# Patient Record
Sex: MALE | Race: WHITE | URBAN - METROPOLITAN AREA
[De-identification: names, ages, dates, MRNs, and addresses within clinical notes are randomized per-mention and may not be internally consistent; named-entity substitution may affect disease eponyms.]

---

## 2017-07-15 ENCOUNTER — EMERGENCY (EMERGENCY)
Facility: HOSPITAL | Age: 57
LOS: 0 days | Discharge: HOME | End: 2017-07-15
Attending: EMERGENCY MEDICINE | Admitting: EMERGENCY MEDICINE
Payer: COMMERCIAL

## 2017-07-15 VITALS
RESPIRATION RATE: 18 BRPM | SYSTOLIC BLOOD PRESSURE: 195 MMHG | TEMPERATURE: 100 F | DIASTOLIC BLOOD PRESSURE: 109 MMHG | HEART RATE: 95 BPM | OXYGEN SATURATION: 100 %

## 2017-07-15 VITALS
OXYGEN SATURATION: 100 % | HEART RATE: 92 BPM | TEMPERATURE: 99 F | RESPIRATION RATE: 16 BRPM | SYSTOLIC BLOOD PRESSURE: 161 MMHG | DIASTOLIC BLOOD PRESSURE: 86 MMHG

## 2017-07-15 DIAGNOSIS — L03.116 CELLULITIS OF LEFT LOWER LIMB: ICD-10-CM

## 2017-07-15 LAB
ALBUMIN SERPL ELPH-MCNC: 3.3 G/DL — SIGNIFICANT CHANGE UP (ref 3.3–5)
ALP SERPL-CCNC: 63 U/L — SIGNIFICANT CHANGE UP (ref 40–120)
ALT FLD-CCNC: 41 U/L — SIGNIFICANT CHANGE UP (ref 12–78)
ANION GAP SERPL CALC-SCNC: 5 MMOL/L — SIGNIFICANT CHANGE UP (ref 5–17)
APTT BLD: 36.2 SEC — SIGNIFICANT CHANGE UP (ref 27.5–37.4)
AST SERPL-CCNC: 40 U/L — HIGH (ref 15–37)
BASOPHILS # BLD AUTO: 0.1 K/UL — SIGNIFICANT CHANGE UP (ref 0–0.2)
BASOPHILS NFR BLD AUTO: 0.9 % — SIGNIFICANT CHANGE UP (ref 0–2)
BILIRUB SERPL-MCNC: 1.1 MG/DL — SIGNIFICANT CHANGE UP (ref 0.2–1.2)
BUN SERPL-MCNC: 19 MG/DL — SIGNIFICANT CHANGE UP (ref 7–23)
CALCIUM SERPL-MCNC: 8.6 MG/DL — SIGNIFICANT CHANGE UP (ref 8.5–10.1)
CHLORIDE SERPL-SCNC: 104 MMOL/L — SIGNIFICANT CHANGE UP (ref 96–108)
CO2 SERPL-SCNC: 28 MMOL/L — SIGNIFICANT CHANGE UP (ref 22–31)
CREAT SERPL-MCNC: 1.11 MG/DL — SIGNIFICANT CHANGE UP (ref 0.5–1.3)
EOSINOPHIL # BLD AUTO: 0.3 K/UL — SIGNIFICANT CHANGE UP (ref 0–0.5)
EOSINOPHIL NFR BLD AUTO: 2.4 % — SIGNIFICANT CHANGE UP (ref 0–6)
GLUCOSE SERPL-MCNC: 107 MG/DL — HIGH (ref 70–99)
HCT VFR BLD CALC: 43.6 % — SIGNIFICANT CHANGE UP (ref 39–50)
HGB BLD-MCNC: 14.7 G/DL — SIGNIFICANT CHANGE UP (ref 13–17)
INR BLD: 1.71 RATIO — HIGH (ref 0.88–1.16)
LYMPHOCYTES # BLD AUTO: 19.1 % — SIGNIFICANT CHANGE UP (ref 13–44)
LYMPHOCYTES # BLD AUTO: 2.1 K/UL — SIGNIFICANT CHANGE UP (ref 1–3.3)
MCHC RBC-ENTMCNC: 30.1 PG — SIGNIFICANT CHANGE UP (ref 27–34)
MCHC RBC-ENTMCNC: 33.6 GM/DL — SIGNIFICANT CHANGE UP (ref 32–36)
MCV RBC AUTO: 89.6 FL — SIGNIFICANT CHANGE UP (ref 80–100)
MONOCYTES # BLD AUTO: 1.4 K/UL — HIGH (ref 0–0.9)
MONOCYTES NFR BLD AUTO: 12.5 % — SIGNIFICANT CHANGE UP (ref 2–14)
NEUTROPHILS # BLD AUTO: 7.1 K/UL — SIGNIFICANT CHANGE UP (ref 1.8–7.4)
NEUTROPHILS NFR BLD AUTO: 65.1 % — SIGNIFICANT CHANGE UP (ref 43–77)
PLATELET # BLD AUTO: 241 K/UL — SIGNIFICANT CHANGE UP (ref 150–400)
POTASSIUM SERPL-MCNC: 4.2 MMOL/L — SIGNIFICANT CHANGE UP (ref 3.5–5.3)
POTASSIUM SERPL-SCNC: 4.2 MMOL/L — SIGNIFICANT CHANGE UP (ref 3.5–5.3)
PROT SERPL-MCNC: 7.6 GM/DL — SIGNIFICANT CHANGE UP (ref 6–8.3)
PROTHROM AB SERPL-ACNC: 18.7 SEC — HIGH (ref 9.8–12.7)
RBC # BLD: 4.87 M/UL — SIGNIFICANT CHANGE UP (ref 4.2–5.8)
RBC # FLD: 12.3 % — SIGNIFICANT CHANGE UP (ref 10.3–14.5)
SODIUM SERPL-SCNC: 137 MMOL/L — SIGNIFICANT CHANGE UP (ref 135–145)
WBC # BLD: 10.9 K/UL — HIGH (ref 3.8–10.5)
WBC # FLD AUTO: 10.9 K/UL — HIGH (ref 3.8–10.5)

## 2017-07-15 PROCEDURE — 99284 EMERGENCY DEPT VISIT MOD MDM: CPT

## 2017-07-15 PROCEDURE — 93971 EXTREMITY STUDY: CPT | Mod: 26,LT

## 2017-07-15 RX ORDER — CEPHALEXIN 500 MG
1 CAPSULE ORAL
Qty: 40 | Refills: 0
Start: 2017-07-15 | End: 2017-07-25

## 2017-07-15 RX ORDER — CEFTRIAXONE 500 MG/1
1 INJECTION, POWDER, FOR SOLUTION INTRAMUSCULAR; INTRAVENOUS ONCE
Qty: 0 | Refills: 0 | Status: COMPLETED | OUTPATIENT
Start: 2017-07-15 | End: 2017-07-15

## 2017-07-15 RX ADMIN — Medication 1 TABLET(S): at 22:17

## 2017-07-15 RX ADMIN — CEFTRIAXONE 100 GRAM(S): 500 INJECTION, POWDER, FOR SOLUTION INTRAMUSCULAR; INTRAVENOUS at 22:17

## 2017-07-15 NOTE — ED STATDOCS - ATTENDING CONTRIBUTION TO CARE
Attending Contribution to Care: I, Jennifer Horne, performed the initial face to face bedside interview with this patient regarding history of present illness, review of symptoms and relevant past medical, social and family history.  I completed an independent physical examination.  I was the initial provider who evaluated this patient. I have signed out the follow up of any pending tests (i.e. labs, radiological studies) to the ACP.  I have communicated the patient’s plan of care and disposition with the ACP.

## 2017-07-15 NOTE — ED STATDOCS - PROGRESS NOTE DETAILS
Patient seen and evaluated, reviewed labs and sono results, to be d/c home on keflex and bactrim and PMD f/u.  Return precautions reviewed -Madiha Trammell PA-C

## 2017-07-15 NOTE — ED ADULT NURSE NOTE - OBJECTIVE STATEMENT
Patient comes to ED for swelling and redness to left leg. pt states he got bite on wednesday when he was in the grass. pt states the swelling and redness has been getting worse

## 2017-07-15 NOTE — ED ADULT TRIAGE NOTE - CHIEF COMPLAINT QUOTE
"I have a bug bite on my left leg."   Leg swelling/redness for the past three days. On Levaquin since Thursday with worsening of signs and symptoms.

## 2017-07-15 NOTE — ED STATDOCS - CHPI ED SYMPTOM NEG
no vomiting/no abdominal distention/no palpitations/no dysuria/no fever/no burning urination/no blood in stool/no diarrhea/no hematuria/no nausea

## 2017-07-15 NOTE — ED STATDOCS - OBJECTIVE STATEMENT
56 y/o M PMHx Afib on coumadin and digoxin, presents to the ED c/o left leg swelling. The pt provides that he was bitten by a bug while walking in the grass on Wednesday, with increased swelling and pain over his left calf. The pt notes that he states that he has had long car rides recently. Pt was given Levaquin for leg swelling. No h/o cp, cough, sob, headache, fever, chills, dizziness, abd pain, nvd, or urinary incontinence.

## 2017-07-23 ENCOUNTER — EMERGENCY (EMERGENCY)
Facility: HOSPITAL | Age: 57
LOS: 0 days | Discharge: HOME | End: 2017-07-23
Attending: EMERGENCY MEDICINE | Admitting: EMERGENCY MEDICINE
Payer: COMMERCIAL

## 2017-07-23 VITALS
TEMPERATURE: 98 F | SYSTOLIC BLOOD PRESSURE: 172 MMHG | DIASTOLIC BLOOD PRESSURE: 91 MMHG | RESPIRATION RATE: 18 BRPM | HEART RATE: 68 BPM | OXYGEN SATURATION: 99 %

## 2017-07-23 DIAGNOSIS — L03.116 CELLULITIS OF LEFT LOWER LIMB: ICD-10-CM

## 2017-07-23 DIAGNOSIS — L27.0 GENERALIZED SKIN ERUPTION DUE TO DRUGS AND MEDICAMENTS TAKEN INTERNALLY: ICD-10-CM

## 2017-07-23 DIAGNOSIS — R21 RASH AND OTHER NONSPECIFIC SKIN ERUPTION: ICD-10-CM

## 2017-07-23 LAB
ALBUMIN SERPL ELPH-MCNC: 3 G/DL — LOW (ref 3.3–5)
ALP SERPL-CCNC: 74 U/L — SIGNIFICANT CHANGE UP (ref 40–120)
ALT FLD-CCNC: 33 U/L — SIGNIFICANT CHANGE UP (ref 12–78)
ANION GAP SERPL CALC-SCNC: 3 MMOL/L — LOW (ref 5–17)
AST SERPL-CCNC: 16 U/L — SIGNIFICANT CHANGE UP (ref 15–37)
BASOPHILS # BLD AUTO: 0.1 K/UL — SIGNIFICANT CHANGE UP (ref 0–0.2)
BASOPHILS NFR BLD AUTO: 0.8 % — SIGNIFICANT CHANGE UP (ref 0–2)
BILIRUB SERPL-MCNC: 0.6 MG/DL — SIGNIFICANT CHANGE UP (ref 0.2–1.2)
BUN SERPL-MCNC: 19 MG/DL — SIGNIFICANT CHANGE UP (ref 7–23)
CALCIUM SERPL-MCNC: 8.5 MG/DL — SIGNIFICANT CHANGE UP (ref 8.5–10.1)
CHLORIDE SERPL-SCNC: 105 MMOL/L — SIGNIFICANT CHANGE UP (ref 96–108)
CO2 SERPL-SCNC: 28 MMOL/L — SIGNIFICANT CHANGE UP (ref 22–31)
CREAT SERPL-MCNC: 1.2 MG/DL — SIGNIFICANT CHANGE UP (ref 0.5–1.3)
EOSINOPHIL # BLD AUTO: 0.6 K/UL — HIGH (ref 0–0.5)
EOSINOPHIL NFR BLD AUTO: 6.4 % — HIGH (ref 0–6)
GLUCOSE SERPL-MCNC: 94 MG/DL — SIGNIFICANT CHANGE UP (ref 70–99)
HCT VFR BLD CALC: 47.2 % — SIGNIFICANT CHANGE UP (ref 39–50)
HGB BLD-MCNC: 15.4 G/DL — SIGNIFICANT CHANGE UP (ref 13–17)
INR BLD: 2.4 RATIO — HIGH (ref 0.88–1.16)
LYMPHOCYTES # BLD AUTO: 1.2 K/UL — SIGNIFICANT CHANGE UP (ref 1–3.3)
LYMPHOCYTES # BLD AUTO: 11.9 % — LOW (ref 13–44)
MCHC RBC-ENTMCNC: 28.9 PG — SIGNIFICANT CHANGE UP (ref 27–34)
MCHC RBC-ENTMCNC: 32.5 GM/DL — SIGNIFICANT CHANGE UP (ref 32–36)
MCV RBC AUTO: 88.9 FL — SIGNIFICANT CHANGE UP (ref 80–100)
MONOCYTES # BLD AUTO: 0.7 K/UL — SIGNIFICANT CHANGE UP (ref 0–0.9)
MONOCYTES NFR BLD AUTO: 7.6 % — SIGNIFICANT CHANGE UP (ref 2–14)
NEUTROPHILS # BLD AUTO: 7.1 K/UL — SIGNIFICANT CHANGE UP (ref 1.8–7.4)
NEUTROPHILS NFR BLD AUTO: 73.3 % — SIGNIFICANT CHANGE UP (ref 43–77)
PLATELET # BLD AUTO: 376 K/UL — SIGNIFICANT CHANGE UP (ref 150–400)
POTASSIUM SERPL-MCNC: 5.5 MMOL/L — HIGH (ref 3.5–5.3)
POTASSIUM SERPL-SCNC: 5.5 MMOL/L — HIGH (ref 3.5–5.3)
PROT SERPL-MCNC: 7.5 GM/DL — SIGNIFICANT CHANGE UP (ref 6–8.3)
PROTHROM AB SERPL-ACNC: 26.4 SEC — HIGH (ref 9.8–12.7)
RBC # BLD: 5.31 M/UL — SIGNIFICANT CHANGE UP (ref 4.2–5.8)
RBC # FLD: 11.8 % — SIGNIFICANT CHANGE UP (ref 10.3–14.5)
SODIUM SERPL-SCNC: 136 MMOL/L — SIGNIFICANT CHANGE UP (ref 135–145)
WBC # BLD: 9.7 K/UL — SIGNIFICANT CHANGE UP (ref 3.8–10.5)
WBC # FLD AUTO: 9.7 K/UL — SIGNIFICANT CHANGE UP (ref 3.8–10.5)

## 2017-07-23 PROCEDURE — 99284 EMERGENCY DEPT VISIT MOD MDM: CPT

## 2017-07-23 RX ORDER — DIPHENHYDRAMINE HCL 50 MG
50 CAPSULE ORAL ONCE
Qty: 0 | Refills: 0 | Status: COMPLETED | OUTPATIENT
Start: 2017-07-23 | End: 2017-07-23

## 2017-07-23 RX ADMIN — Medication 50 MILLIGRAM(S): at 10:11

## 2017-07-23 RX ADMIN — Medication 100 MILLIGRAM(S): at 10:00

## 2017-07-23 RX ADMIN — Medication 50 MILLIGRAM(S): at 09:43

## 2017-07-23 NOTE — ED ADULT TRIAGE NOTE - CHIEF COMPLAINT QUOTE
Patient states he was a patient last week and was prescribed 2 antibiotics. Reports waking up with a rash and possible allergic reaction. Denies shortness of breath

## 2017-07-23 NOTE — ED PROVIDER NOTE - OBJECTIVE STATEMENT
56 y/o M PMHx Afib on coumadin and digoxin, presents with CC of rash.  Pt was seen in ED 7 days ago for LLE cellulitis.  Had already been on Levaquin.  Started on Bactrim, Keflex.  States yesterday started having full body pruritic rash.  Also LLE not looking better while on antibiotics.  Denies any other concerns.

## 2017-07-23 NOTE — ED PROVIDER NOTE - MEDICAL DECISION MAKING DETAILS
Pt with drug rash, LLE cellulitis.  Does not want to stay in hospital overnight, because he has a job interview in the morning.  CBC WNL.  INR therapeutic.  Pt given IV clindamycin (has vancomycin allergy), prednisone, benadryl for rash.  Will given Rx for clindamycin, and prednisone.  Will have continue benadryl for rash.  Encouraged to return after his interview for admission to hospital, or sooner if he gets worse.

## 2017-07-24 ENCOUNTER — EMERGENCY (EMERGENCY)
Facility: HOSPITAL | Age: 57
LOS: 0 days | Discharge: HOME | End: 2017-07-24
Attending: EMERGENCY MEDICINE | Admitting: EMERGENCY MEDICINE
Payer: COMMERCIAL

## 2017-07-24 VITALS
SYSTOLIC BLOOD PRESSURE: 165 MMHG | DIASTOLIC BLOOD PRESSURE: 76 MMHG | RESPIRATION RATE: 18 BRPM | HEART RATE: 80 BPM | TEMPERATURE: 98 F | OXYGEN SATURATION: 99 %

## 2017-07-24 VITALS — HEIGHT: 76 IN | WEIGHT: 285.06 LBS

## 2017-07-24 DIAGNOSIS — L03.116 CELLULITIS OF LEFT LOWER LIMB: ICD-10-CM

## 2017-07-24 DIAGNOSIS — R60.0 LOCALIZED EDEMA: ICD-10-CM

## 2017-07-24 DIAGNOSIS — R21 RASH AND OTHER NONSPECIFIC SKIN ERUPTION: ICD-10-CM

## 2017-07-24 DIAGNOSIS — L50.9 URTICARIA, UNSPECIFIED: ICD-10-CM

## 2017-07-24 PROCEDURE — 99284 EMERGENCY DEPT VISIT MOD MDM: CPT

## 2017-07-24 NOTE — ED STATDOCS - OBJECTIVE STATEMENT
56 y/o M presents to ED for evaluation of left leg swelling with erythema. Pt states this began 1 week ago and was given levaquin by his PCP. Pt states Sx did not improve and he was referred to the ED. He given new ABx and d/c'd home because he had a job interview. Pt states he developed a rash earlier this week prior to switching to new medication which has prompted him to seek evaluation again here in the ED. Pt states he had a nml US this week. No fever or chills.

## 2017-07-24 NOTE — ED STATDOCS - SKIN, MLM
LLE 2+ edema with erythema, not warm to touch. Diffuse urticarial rash with blanching over the trunk and extremities

## 2017-07-24 NOTE — ED ADULT TRIAGE NOTE - CHIEF COMPLAINT QUOTE
c/o allergic reaction to antibiotics, levaquin taken for 6-7 days and was changed to bactrim, prednisone and clindamycin, pt states he has rash, pt getting treated for left leg infection. c/o swelling and pain to left leg.

## 2017-07-24 NOTE — ED STATDOCS - MEDICAL DECISION MAKING DETAILS
Cellulitis appears to be improving, will D/c home with current ABx regimen. Cellulitis appears to be improving, will D/c home with current ABx regimen. subjectively wife and patient state urticaria is resolving and cellulitis is improving. precautions when to return to the ED given and understood.

## 2017-07-28 LAB
CULTURE RESULTS: SIGNIFICANT CHANGE UP
CULTURE RESULTS: SIGNIFICANT CHANGE UP
SPECIMEN SOURCE: SIGNIFICANT CHANGE UP
SPECIMEN SOURCE: SIGNIFICANT CHANGE UP

## 2018-10-21 NOTE — ED ADULT NURSE NOTE - DOES PATIENT HAVE ADVANCE DIRECTIVE
No
You can access the [facility] Patient Portal, offered by [facility], by registering with the following website: [email or web address]

## 2019-06-10 ENCOUNTER — APPOINTMENT (OUTPATIENT)
Dept: OPHTHALMOLOGY | Facility: CLINIC | Age: 59
End: 2019-06-10

## 2021-12-19 ENCOUNTER — TRANSCRIPTION ENCOUNTER (OUTPATIENT)
Age: 61
End: 2021-12-19

## 2022-01-03 ENCOUNTER — OUTPATIENT (OUTPATIENT)
Dept: OUTPATIENT SERVICES | Facility: HOSPITAL | Age: 62
LOS: 1 days | End: 2022-01-03
Payer: COMMERCIAL

## 2022-01-03 DIAGNOSIS — Z20.828 CONTACT WITH AND (SUSPECTED) EXPOSURE TO OTHER VIRAL COMMUNICABLE DISEASES: ICD-10-CM

## 2022-01-03 LAB — SARS-COV-2 RNA SPEC QL NAA+PROBE: DETECTED

## 2022-01-03 PROCEDURE — U0003: CPT

## 2022-01-03 PROCEDURE — U0005: CPT

## 2022-01-03 PROCEDURE — C9803: CPT

## 2022-01-04 ENCOUNTER — TRANSCRIPTION ENCOUNTER (OUTPATIENT)
Age: 62
End: 2022-01-04

## 2022-01-04 DIAGNOSIS — Z20.828 CONTACT WITH AND (SUSPECTED) EXPOSURE TO OTHER VIRAL COMMUNICABLE DISEASES: ICD-10-CM

## 2022-01-10 ENCOUNTER — OUTPATIENT (OUTPATIENT)
Dept: OUTPATIENT SERVICES | Facility: HOSPITAL | Age: 62
LOS: 1 days | End: 2022-01-10
Payer: COMMERCIAL

## 2022-01-10 DIAGNOSIS — Z20.828 CONTACT WITH AND (SUSPECTED) EXPOSURE TO OTHER VIRAL COMMUNICABLE DISEASES: ICD-10-CM

## 2022-01-10 LAB — SARS-COV-2 RNA SPEC QL NAA+PROBE: DETECTED

## 2022-01-10 PROCEDURE — U0003: CPT

## 2022-01-10 PROCEDURE — C9803: CPT

## 2022-01-10 PROCEDURE — U0005: CPT

## 2022-01-11 DIAGNOSIS — Z20.828 CONTACT WITH AND (SUSPECTED) EXPOSURE TO OTHER VIRAL COMMUNICABLE DISEASES: ICD-10-CM

## 2022-09-10 ENCOUNTER — EMERGENCY (EMERGENCY)
Facility: HOSPITAL | Age: 62
LOS: 0 days | Discharge: HOME | End: 2022-09-10
Attending: EMERGENCY MEDICINE
Payer: COMMERCIAL

## 2022-09-10 VITALS
RESPIRATION RATE: 18 BRPM | HEART RATE: 99 BPM | SYSTOLIC BLOOD PRESSURE: 141 MMHG | DIASTOLIC BLOOD PRESSURE: 91 MMHG | TEMPERATURE: 98 F | OXYGEN SATURATION: 98 %

## 2022-09-10 VITALS — WEIGHT: 289.91 LBS | HEIGHT: 76 IN

## 2022-09-10 DIAGNOSIS — S81.812A LACERATION WITHOUT FOREIGN BODY, LEFT LOWER LEG, INITIAL ENCOUNTER: ICD-10-CM

## 2022-09-10 DIAGNOSIS — R60.0 LOCALIZED EDEMA: ICD-10-CM
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
## 2022-09-10 DIAGNOSIS — Z88.1 ALLERGY STATUS TO OTHER ANTIBIOTIC AGENTS STATUS: ICD-10-CM

## 2022-09-10 DIAGNOSIS — I48.91 UNSPECIFIED ATRIAL FIBRILLATION: ICD-10-CM

## 2022-09-10 DIAGNOSIS — Z88.2 ALLERGY STATUS TO SULFONAMIDES: ICD-10-CM

## 2022-09-10 PROCEDURE — 99282 EMERGENCY DEPT VISIT SF MDM: CPT

## 2022-09-10 PROCEDURE — 12002 RPR S/N/AX/GEN/TRNK2.6-7.5CM: CPT

## 2022-09-10 PROCEDURE — 99283 EMERGENCY DEPT VISIT LOW MDM: CPT | Mod: 25

## 2022-09-10 NOTE — ED STATDOCS - PATIENT PORTAL LINK FT
You can access the FollowMyHealth Patient Portal offered by [facility] by registering at the following website: [email or web address]. By joining [employer]’s FollowMyHealth portal, you will also be able to view your health information using other applications (apps) compatible with our system.

## 2022-09-10 NOTE — ED ADULT NURSE NOTE - OBJECTIVE STATEMENT
Pt reports LLE wound from dog scratch 3 hours ago.  laceration to left leg noted.  Pt denies any bites from the animal.  Pt states that he is actively dealing with cellulitis to the left leg.  Small patch of cellulitis noted to left leg next to laceration.

## 2022-09-10 NOTE — ED STATDOCS - CLINICAL SUMMARY MEDICAL DECISION MAKING FREE TEXT BOX
Will perform lose closure, pt on abx which is sufficient, EKG with noted Afib, has h/o Afib, d/c home with precautions. Will perform loose closure, pt on abx which are sufficient, EKG with noted Afib, has h/o Afib, d/c home with precautions.

## 2022-09-10 NOTE — ED STATDOCS - OBJECTIVE STATEMENT
63 y/o male with a PMhx of Afib, presents to the ED c/o animal bite. States he has a h/o cellulitis and edema with associated wound, on Levaquin. Notes a chihuahua  bit his LLE this morning. On blood thinners. No other complaints.

## 2022-09-10 NOTE — ED STATDOCS - NSFOLLOWUPINSTRUCTIONS_ED_ALL_ED_FT
Laceration    A laceration is a cut that goes through all of the layers of the skin and into the tissue that is right under the skin. Some lacerations heal on their own. Others need to be closed with skin adhesive strips, skin glue, stitches (sutures), or staples. Proper laceration care minimizes the risk of infection and helps the laceration to heal better.  If non-absorbable stitches or staples have been placed, they must be taken out within the time frame instructed by your healthcare provider.    SEEK IMMEDIATE MEDICAL CARE IF YOU HAVE ANY OF THE FOLLOWING SYMPTOMS: swelling around the wound, worsening pain, drainage from the wound, red streaking going away from your wound, inability to move finger or toe near the laceration, or discoloration of skin near the laceration.    keep area clean and do not wet for first 24 hours   continue Levaquin as directed   fu with PMD in 10-14 days for suture removal or return to ED for any worsening of symptoms

## 2022-09-10 NOTE — ED STATDOCS - PROGRESS NOTE DETAILS
pt has 6 lose stiches in his left lower leg will fu with pmd for removal in 10-14 days. pt advised to continue Levaquin and return to ED for any worsening of symptoms, pt well appearing on dc. -Chani Altman PA-C

## 2022-09-10 NOTE — ED STATDOCS - SKIN, MLM
skin normal color for race, warm. +3cm laceration to medial mid calf, neurovascularly intact distally

## 2022-12-10 ENCOUNTER — EMERGENCY (EMERGENCY)
Facility: HOSPITAL | Age: 62
LOS: 0 days | Discharge: HOME | End: 2022-12-10
Attending: EMERGENCY MEDICINE
Payer: COMMERCIAL

## 2022-12-10 VITALS
RESPIRATION RATE: 18 BRPM | SYSTOLIC BLOOD PRESSURE: 145 MMHG | HEART RATE: 98 BPM | DIASTOLIC BLOOD PRESSURE: 105 MMHG | OXYGEN SATURATION: 99 % | TEMPERATURE: 99 F

## 2022-12-10 VITALS — HEIGHT: 76 IN | WEIGHT: 294.98 LBS

## 2022-12-10 DIAGNOSIS — M79.671 PAIN IN RIGHT FOOT: ICD-10-CM

## 2022-12-10 DIAGNOSIS — Z88.2 ALLERGY STATUS TO SULFONAMIDES: ICD-10-CM

## 2022-12-10 DIAGNOSIS — Z79.01 LONG TERM (CURRENT) USE OF ANTICOAGULANTS: ICD-10-CM

## 2022-12-10 DIAGNOSIS — I48.91 UNSPECIFIED ATRIAL FIBRILLATION: ICD-10-CM

## 2022-12-10 DIAGNOSIS — R60.0 LOCALIZED EDEMA: ICD-10-CM

## 2022-12-10 DIAGNOSIS — Z88.1 ALLERGY STATUS TO OTHER ANTIBIOTIC AGENTS STATUS: ICD-10-CM
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
## 2022-12-10 DIAGNOSIS — S92.501A DISPLACED UNSPECIFIED FRACTURE OF RIGHT LESSER TOE(S), INITIAL ENCOUNTER FOR CLOSED FRACTURE: ICD-10-CM

## 2022-12-10 PROCEDURE — 29515 APPLICATION SHORT LEG SPLINT: CPT | Mod: RT

## 2022-12-10 PROCEDURE — 99284 EMERGENCY DEPT VISIT MOD MDM: CPT | Mod: 25

## 2022-12-10 PROCEDURE — 73630 X-RAY EXAM OF FOOT: CPT | Mod: RT

## 2022-12-10 PROCEDURE — 29515 APPLICATION SHORT LEG SPLINT: CPT

## 2022-12-10 PROCEDURE — 73630 X-RAY EXAM OF FOOT: CPT | Mod: 26,RT

## 2022-12-10 PROCEDURE — 73610 X-RAY EXAM OF ANKLE: CPT | Mod: 26,RT

## 2022-12-10 PROCEDURE — 73610 X-RAY EXAM OF ANKLE: CPT | Mod: RT

## 2022-12-10 NOTE — ED STATDOCS - NSFOLLOWUPINSTRUCTIONS_ED_ALL_ED_FT
Metatarsal Fracture    Foot anatomy showing the metatarsal bones.   A metatarsal fracture is a break in one of the five bones that connect the toes to the rest of the foot. This may also be called a forefoot fracture. A metatarsal fracture may be:  •A crack in the surface of the bone (stress fracture). This often occurs in athletes.      •A break all the way through the bone (complete fracture).      The bone that connects to the little toe (fifth metatarsal) is most commonly fractured. Ballet dancers often fracture this bone.      What are the causes?    A metatarsal fracture may be caused by:  •Sudden twisting of the foot.      •Falling onto the foot.      •Something heavy falling onto the foot.      •Overuse or repetitive exercise.        What increases the risk?    This condition is more likely to develop in people who:  •Play contact sports.      •Do ballet.      •Have a condition that causes the bones to become thin and brittle (osteoporosis).      •Have a low calcium level.        What are the signs or symptoms?    Symptoms of this condition include:  •Pain that gets worse when walking or standing.      •Pain when pressing on the foot or moving the toes.      •Swelling.      •Bruising on the top or bottom of the foot.        How is this diagnosed?    This condition may be diagnosed based on:  •Your symptoms.       •Any recent foot injuries you have had.       •A physical exam.    •An X-ray of your foot. If you have a stress fracture, it may not show up on an X-ray, and you may need other imaging tests, such as:   •A bone scan.      • CT scan.       •MRI.          How is this treated?    Treatment depends on how severe your fracture is and how the pieces of the broken bone line up with each other (alignment). Treatment may involve:  •Wearing a cast, splint, or supportive boot on your foot.      •Using crutches, and not putting any weight on your foot.      •Having surgery to align broken bones (open reduction and internal fixation, ORIF).      •Physical therapy.      •Follow-up visits and X-rays to make sure you are healing.        Follow these instructions at home:    If you have a splint or a supportive boot:     •Wear the splint or boot as told by your health care provider. Remove it only as told by your health care provider.      • Loosen the splint or boot if your toes tingle, become numb, or turn cold and blue.       •Keep the splint or boot clean.     •If your splint or boot is not waterproof:   •Do not let it get wet.       •Cover it with a watertight covering when you take a bath or a shower.        If you have a cast:     • Do not stick anything inside the cast to scratch your skin. Doing that increases your risk for infection.      •Check the skin around the cast every day. Tell your health care provider about any concerns.      •You may put lotion on dry skin around the edges of the cast. Do not put lotion on the skin underneath the cast.      •Keep the cast clean.    •If the cast is not waterproof:   •Do not let it get wet.      •Cover it with a watertight covering when you take a bath or a shower.        Activity     • Do not use your affected leg to support your body weight until your health care provider says that you can. Use crutches as directed.      •Ask your health care provider what activities are safe for you during recovery, and ask what activities you need to avoid.      •Do physical therapy exercises as directed.      Driving     • Do not drive or use heavy machinery while taking pain medicine.      • Do not drive while wearing a cast, splint, or boot on a foot that you use for driving.        Managing pain, stiffness, and swelling   A bag of ice on a towel on the skin. •If directed, put ice on painful areas:  •Put ice in a plastic bag.    •Place a towel between your skin and the bag.  •If you have a removable splint or boot, remove it as told by your health care provider.      •If you have a cast, place a towel between your cast and the bag.        •Leave the ice on for 20 minutes, 2–3 times a day.        •Move your toes often to avoid stiffness and to lessen swelling.      •Raise (elevate) your lower leg above the level of your heart while you are sitting or lying down.      General instructions     • Do not put pressure on any part of the cast or splint until it is fully hardened. This may take several hours.      •Take over-the-counter and prescription medicines only as told by your health care provider.      • Do not use any products that contain nicotine or tobacco, such as cigarettes and e-cigarettes. These can delay bone healing. If you need help quitting, ask your health care provider.      •  Do not take baths, swim, or use a hot tub until your health care provider approves. Ask your health care provider if you may take showers.      •Keep all follow-up visits as told by your health care provider. This is important.        Contact a health care provider if you have:    •Pain that gets worse or does not get better with medicine.       •A fever.      •A bad smell coming from your cast or splint.        Get help right away if you have:  •Any of the following in your toes or your foot, even after loosening your splint (if applicable):   •Numbness.       •Tingling.       •Coldness.      • Blue skin.         •Redness or swelling that gets worse.      •Pain that suddenly becomes severe.        Summary    •A metatarsal fracture is a break in one of the five bones that connect the toes to the rest of the foot.      •Treatment depends on how severe your fracture is and how the pieces of the broken bone line up with each other (alignment). This may include wearing a cast, splint, or supportive boot, or using crutches. Sometimes surgery is needed to align the bones.      •Ice and elevate your foot to help lessen the pain and swelling.      •Make sure you know what symptoms should cause you to get help right away.      This information is not intended to replace advice given to you by your health care provider. Make sure you discuss any questions you have with your health care provider.

## 2022-12-10 NOTE — ED STATDOCS - PHYSICAL EXAMINATION
Constitutional: NAD AOx3  Eyes: PERRL EOMI  Head: Normocephalic atraumatic  Mouth: MMM  Cardiac: regular rate and rhythm  Resp: Lungs CTAB  GI: Abd s/nd/nt  Neuro: CN2-12 grossly intact, CARBAJAL x 4  Skin: No visible rashes  +BLE edema, R foot ttp along 5th metatarsal, ankle non ttp, knee non ttp. Attending: Constitutional: NAD AOx3  Eyes: PERRL EOMI  Head: Normocephalic atraumatic  Mouth: MMM  Cardiac: regular rate and rhythm  Resp: Lungs CTAB  GI: Abd s/nd/nt  Neuro: CN2-12 grossly intact, CARBAJAL x 4  Skin: No visible rashes  +BLE edema, R foot ttp along 5th metatarsal, ankle non ttp, knee non ttp.

## 2022-12-10 NOTE — ED STATDOCS - CLINICAL SUMMARY MEDICAL DECISION MAKING FREE TEXT BOX
Xray and reeval. Xray and reeval.    PA note: +Bowles fracture. Orthoglass posterior splint applied. All radiology results discussed in detail with patient. Patient re-examined and re-evaluated. Patient feels much better at this time. ED evaluation, Diagnosis and management discussed with the patient in detail. Workup results discussed with ED attending, OK to dc home. Close ORTHO MD follow up encouraged, aftercare to assist with scheduling appointment ASAP. Strict ED return instructions discussed in detail and patient given the opportunity to ask any questions about their discharge diagnosis and instructions. Patient verbalized understanding. ~ Brant Segovia PA-C today Xray and reeval.    PA note: Patient seen by podiatry, rec posterior splint for +Bowles fracture. Orthoglass posterior splint applied. All radiology results discussed in detail with patient. Patient re-examined and re-evaluated. Patient feels much better at this time. ED evaluation, Diagnosis and management discussed with the patient in detail. Workup results discussed with ED attending, OK to [place] home. Close ORTHO MD follow up encouraged, aftercare to assist with scheduling appointment ASAP. Strict ED return instructions discussed in detail and patient given the opportunity to ask any questions about their discharge diagnosis and instructions. Patient verbalized understanding. ~ Brant Segovia PA-C

## 2022-12-10 NOTE — ED STATDOCS - OBJECTIVE STATEMENT
61 y/o M w/ PMHx of A fib presents to ED c/o R foot pain after stepping into a pothole last weekend. Also reports cellulitis formerly on antibiotics. No other complaints at this time.

## 2022-12-10 NOTE — ED STATDOCS - PROGRESS NOTE DETAILS
PA note: +Bowles fracture. Orthoglass posterior splint applied. All radiology results discussed in detail with patient. Patient re-examined and re-evaluated. Patient feels much better at this time. ED evaluation, Diagnosis and management discussed with the patient in detail. Workup results discussed with ED attending, OK to dc home. Close ORTHO MD follow up encouraged, aftercare to assist with scheduling appointment ASAP. Strict ED return instructions discussed in detail and patient given the opportunity to ask any questions about their discharge diagnosis and instructions. Patient verbalized understanding. ~ Brant Segovia PA-C 63 y/o M w/ PMHx of A fib presents to ED c/o R foot pain after stepping into a pothole last weekend. Also reports cellulitis formerly on antibiotics. No other complaints at this time. PA: Patient is a 61 y/o male with PMHx of Afib, leg edema, LE cellulitis, who presents to [facility] c/o RIGHT foot injury after stepping into a pothole 1 week ago. ~Brant Segovia PA-C PA note: +Bowles fracture. Results reviewed with patient. Patient and wife are requesting a Podiatry consult. ~Brant Segovia PA-C Spoke with podiatry resident, will see patient in ED shortly. ~Brant Segovia PA-C PA note: Patient seen by podiatry, rec posterior splint for +Bowles fracture. Orthoglass posterior splint applied. All radiology results discussed in detail with patient. Patient re-examined and re-evaluated. Patient feels much better at this time. ED evaluation, Diagnosis and management discussed with the patient in detail. Workup results discussed with ED attending, OK to [place] home. Close ORTHO MD follow up encouraged, aftercare to assist with scheduling appointment ASAP. Strict ED return instructions discussed in detail and patient given the opportunity to ask any questions about their discharge diagnosis and instructions. Patient verbalized understanding. ~ Brant Segovia PA-C

## 2022-12-10 NOTE — ED STATDOCS - PATIENT PORTAL LINK FT
You can access the FollowMyHealth Patient Portal offered by [facility] by registering at the following website: [email or web address]. By joining [employer]’s FollowMyHealth portal, you will also be able to view your health information using other applications (apps) compatible with our system. You can access the FollowMyHealth Patient Portal offered by [facility] by registering at the following website: [email or web address]. By joining [employer]’s FollowMyHealth portal, you will also be able to view your health information using other applications (apps) compatible with our system.

## 2022-12-10 NOTE — ED STATDOCS - CARE PROVIDER_API CALL
Brett Crockett (DPM)  Orthopaedic Surgery Surgery  [address] [facility], [address] [place] [address]  Phone: [phone]  Fax: [phone]  Follow Up Time: Urgent

## 2022-12-10 NOTE — ED STATDOCS - NS ED ROS FT
Constitutional: No fever or chills  Eyes: No visual changes  HEENT: No throat pain  CV: No chest pain  Resp: No SOB no cough  GI: No abd pain, nausea or vomiting  : No dysuria  MSK: +R foot pain  Skin: No rash  Neuro: No headache

## 2023-02-17 ENCOUNTER — INPATIENT (INPATIENT)
Facility: HOSPITAL | Age: 63
LOS: 0 days | Discharge: ANOTHER HOSPITAL | DRG: 280 | End: 2023-02-18
Attending: HOSPITALIST | Admitting: STUDENT IN AN ORGANIZED HEALTH CARE EDUCATION/TRAINING PROGRAM
Payer: COMMERCIAL

## 2023-02-17 ENCOUNTER — TRANSCRIPTION ENCOUNTER (OUTPATIENT)
Age: 63
End: 2023-02-17

## 2023-02-17 VITALS — HEIGHT: 75 IN | WEIGHT: 294.98 LBS

## 2023-02-17 DIAGNOSIS — I21.4 NON-ST ELEVATION (NSTEMI) MYOCARDIAL INFARCTION: ICD-10-CM

## 2023-02-17 DIAGNOSIS — I10 ESSENTIAL (PRIMARY) HYPERTENSION: ICD-10-CM

## 2023-02-17 DIAGNOSIS — Z86.69 PERSONAL HISTORY OF OTHER DISEASES OF THE NERVOUS SYSTEM AND SENSE ORGANS: ICD-10-CM

## 2023-02-17 DIAGNOSIS — I48.20 CHRONIC ATRIAL FIBRILLATION, UNSPECIFIED: ICD-10-CM

## 2023-02-17 DIAGNOSIS — R07.9 CHEST PAIN, UNSPECIFIED: ICD-10-CM

## 2023-02-17 LAB
ADD ON TEST-SPECIMEN IN LAB: SIGNIFICANT CHANGE UP
ALBUMIN SERPL ELPH-MCNC: 3.4 G/DL — SIGNIFICANT CHANGE UP (ref 3.3–5)
ALP SERPL-CCNC: 47 U/L — SIGNIFICANT CHANGE UP (ref 40–120)
ALT FLD-CCNC: 29 U/L — SIGNIFICANT CHANGE UP (ref 12–78)
ANION GAP SERPL CALC-SCNC: 1 MMOL/L — LOW (ref 5–17)
APTT BLD: 34.8 SEC — SIGNIFICANT CHANGE UP (ref 27.5–35.5)
AST SERPL-CCNC: 19 U/L — SIGNIFICANT CHANGE UP (ref 15–37)
BASOPHILS # BLD AUTO: 0.03 K/UL — SIGNIFICANT CHANGE UP (ref 0–0.2)
BASOPHILS NFR BLD AUTO: 0.3 % — SIGNIFICANT CHANGE UP (ref 0–2)
BILIRUB SERPL-MCNC: 1.2 MG/DL — SIGNIFICANT CHANGE UP (ref 0.2–1.2)
BUN SERPL-MCNC: 18 MG/DL — SIGNIFICANT CHANGE UP (ref 7–23)
CALCIUM SERPL-MCNC: 8.9 MG/DL — SIGNIFICANT CHANGE UP (ref 8.5–10.1)
CHLORIDE SERPL-SCNC: 108 MMOL/L — SIGNIFICANT CHANGE UP (ref 96–108)
CO2 SERPL-SCNC: 30 MMOL/L — SIGNIFICANT CHANGE UP (ref 22–31)
CREAT SERPL-MCNC: 1 MG/DL — SIGNIFICANT CHANGE UP (ref 0.5–1.3)
D DIMER BLD IA.RAPID-MCNC: 173 NG/ML DDU — SIGNIFICANT CHANGE UP
EGFR: 85 ML/MIN/1.73M2 — SIGNIFICANT CHANGE UP
EOSINOPHIL # BLD AUTO: 0.11 K/UL — SIGNIFICANT CHANGE UP (ref 0–0.5)
EOSINOPHIL NFR BLD AUTO: 1 % — SIGNIFICANT CHANGE UP (ref 0–6)
FLUAV AG NPH QL: SIGNIFICANT CHANGE UP
FLUBV AG NPH QL: SIGNIFICANT CHANGE UP
GLUCOSE SERPL-MCNC: 96 MG/DL — SIGNIFICANT CHANGE UP (ref 70–99)
HCT VFR BLD CALC: 43.4 % — SIGNIFICANT CHANGE UP (ref 39–50)
HCT VFR BLD CALC: 49 % — SIGNIFICANT CHANGE UP (ref 39–50)
HGB BLD-MCNC: 13.9 G/DL — SIGNIFICANT CHANGE UP (ref 13–17)
HGB BLD-MCNC: 15.6 G/DL — SIGNIFICANT CHANGE UP (ref 13–17)
IMM GRANULOCYTES NFR BLD AUTO: 0.7 % — SIGNIFICANT CHANGE UP (ref 0–0.9)
INR BLD: 1.51 RATIO — HIGH (ref 0.88–1.16)
LYMPHOCYTES # BLD AUTO: 18.4 % — SIGNIFICANT CHANGE UP (ref 13–44)
LYMPHOCYTES # BLD AUTO: 2.03 K/UL — SIGNIFICANT CHANGE UP (ref 1–3.3)
MCHC RBC-ENTMCNC: 30.2 PG — SIGNIFICANT CHANGE UP (ref 27–34)
MCHC RBC-ENTMCNC: 30.3 PG — SIGNIFICANT CHANGE UP (ref 27–34)
MCHC RBC-ENTMCNC: 31.8 GM/DL — LOW (ref 32–36)
MCHC RBC-ENTMCNC: 32 GM/DL — SIGNIFICANT CHANGE UP (ref 32–36)
MCV RBC AUTO: 94.6 FL — SIGNIFICANT CHANGE UP (ref 80–100)
MCV RBC AUTO: 95 FL — SIGNIFICANT CHANGE UP (ref 80–100)
MONOCYTES # BLD AUTO: 0.91 K/UL — HIGH (ref 0–0.9)
MONOCYTES NFR BLD AUTO: 8.3 % — SIGNIFICANT CHANGE UP (ref 2–14)
NEUTROPHILS # BLD AUTO: 7.87 K/UL — HIGH (ref 1.8–7.4)
NEUTROPHILS NFR BLD AUTO: 71.3 % — SIGNIFICANT CHANGE UP (ref 43–77)
PLATELET # BLD AUTO: 251 K/UL — SIGNIFICANT CHANGE UP (ref 150–400)
PLATELET # BLD AUTO: 273 K/UL — SIGNIFICANT CHANGE UP (ref 150–400)
POTASSIUM SERPL-MCNC: 4.1 MMOL/L — SIGNIFICANT CHANGE UP (ref 3.5–5.3)
POTASSIUM SERPL-SCNC: 4.1 MMOL/L — SIGNIFICANT CHANGE UP (ref 3.5–5.3)
PROT SERPL-MCNC: 7.2 GM/DL — SIGNIFICANT CHANGE UP (ref 6–8.3)
PROTHROM AB SERPL-ACNC: 17.6 SEC — HIGH (ref 10.5–13.4)
RBC # BLD: 4.59 M/UL — SIGNIFICANT CHANGE UP (ref 4.2–5.8)
RBC # BLD: 5.16 M/UL — SIGNIFICANT CHANGE UP (ref 4.2–5.8)
RBC # FLD: 13.2 % — SIGNIFICANT CHANGE UP (ref 10.3–14.5)
RBC # FLD: 13.2 % — SIGNIFICANT CHANGE UP (ref 10.3–14.5)
RSV RNA NPH QL NAA+NON-PROBE: SIGNIFICANT CHANGE UP
SARS-COV-2 RNA SPEC QL NAA+PROBE: DETECTED
SODIUM SERPL-SCNC: 139 MMOL/L — SIGNIFICANT CHANGE UP (ref 135–145)
TROPONIN I, HIGH SENSITIVITY RESULT: 1356.86 NG/L — HIGH
TROPONIN I, HIGH SENSITIVITY RESULT: 32.06 NG/L — SIGNIFICANT CHANGE UP
TROPONIN I, HIGH SENSITIVITY RESULT: 7107.08 NG/L — HIGH
WBC # BLD: 11.03 K/UL — HIGH (ref 3.8–10.5)
WBC # BLD: 11.71 K/UL — HIGH (ref 3.8–10.5)
WBC # FLD AUTO: 11.03 K/UL — HIGH (ref 3.8–10.5)
WBC # FLD AUTO: 11.71 K/UL — HIGH (ref 3.8–10.5)

## 2023-02-17 PROCEDURE — 36415 COLL VENOUS BLD VENIPUNCTURE: CPT

## 2023-02-17 PROCEDURE — 84484 ASSAY OF TROPONIN QUANT: CPT

## 2023-02-17 PROCEDURE — 93005 ELECTROCARDIOGRAM TRACING: CPT

## 2023-02-17 PROCEDURE — 85027 COMPLETE CBC AUTOMATED: CPT

## 2023-02-17 PROCEDURE — 93010 ELECTROCARDIOGRAM REPORT: CPT | Mod: 76

## 2023-02-17 PROCEDURE — 99223 1ST HOSP IP/OBS HIGH 75: CPT

## 2023-02-17 PROCEDURE — 93306 TTE W/DOPPLER COMPLETE: CPT | Mod: 26

## 2023-02-17 PROCEDURE — 80061 LIPID PANEL: CPT

## 2023-02-17 PROCEDURE — 71045 X-RAY EXAM CHEST 1 VIEW: CPT | Mod: 26

## 2023-02-17 PROCEDURE — 85610 PROTHROMBIN TIME: CPT

## 2023-02-17 PROCEDURE — 99285 EMERGENCY DEPT VISIT HI MDM: CPT

## 2023-02-17 PROCEDURE — 85730 THROMBOPLASTIN TIME PARTIAL: CPT

## 2023-02-17 PROCEDURE — 80048 BASIC METABOLIC PNL TOTAL CA: CPT

## 2023-02-17 PROCEDURE — 93306 TTE W/DOPPLER COMPLETE: CPT

## 2023-02-17 PROCEDURE — 86803 HEPATITIS C AB TEST: CPT

## 2023-02-17 RX ORDER — HEPARIN SODIUM 5000 [USP'U]/ML
6000 INJECTION INTRAVENOUS; SUBCUTANEOUS EVERY 6 HOURS
Refills: 0 | Status: DISCONTINUED | OUTPATIENT
Start: 2023-02-17 | End: 2023-02-18

## 2023-02-17 RX ORDER — ATORVASTATIN CALCIUM 80 MG/1
1 TABLET, FILM COATED ORAL
Qty: 0 | Refills: 0 | DISCHARGE
Start: 2023-02-17

## 2023-02-17 RX ORDER — ASPIRIN/CALCIUM CARB/MAGNESIUM 324 MG
1 TABLET ORAL
Qty: 0 | Refills: 0 | DISCHARGE
Start: 2023-02-17

## 2023-02-17 RX ORDER — ALBUTEROL 90 UG/1
2 AEROSOL, METERED ORAL EVERY 6 HOURS
Refills: 0 | Status: DISCONTINUED | OUTPATIENT
Start: 2023-02-17 | End: 2023-02-18

## 2023-02-17 RX ORDER — HEPARIN SODIUM 5000 [USP'U]/ML
INJECTION INTRAVENOUS; SUBCUTANEOUS
Qty: 25000 | Refills: 0 | Status: DISCONTINUED | OUTPATIENT
Start: 2023-02-17 | End: 2023-02-18

## 2023-02-17 RX ORDER — ACETAMINOPHEN 500 MG
650 TABLET ORAL EVERY 6 HOURS
Refills: 0 | Status: DISCONTINUED | OUTPATIENT
Start: 2023-02-17 | End: 2023-02-18

## 2023-02-17 RX ORDER — PYRIDOSTIGMINE BROMIDE 60 MG/5ML
120 SOLUTION ORAL
Refills: 0 | Status: DISCONTINUED | OUTPATIENT
Start: 2023-02-17 | End: 2023-02-18

## 2023-02-17 RX ORDER — ATORVASTATIN CALCIUM 80 MG/1
40 TABLET, FILM COATED ORAL AT BEDTIME
Refills: 0 | Status: DISCONTINUED | OUTPATIENT
Start: 2023-02-17 | End: 2023-02-18

## 2023-02-17 RX ORDER — LISINOPRIL 2.5 MG/1
1 TABLET ORAL
Qty: 0 | Refills: 0 | DISCHARGE
Start: 2023-02-17

## 2023-02-17 RX ORDER — PYRIDOSTIGMINE BROMIDE 60 MG/5ML
60 SOLUTION ORAL AT BEDTIME
Refills: 0 | Status: DISCONTINUED | OUTPATIENT
Start: 2023-02-17 | End: 2023-02-18

## 2023-02-17 RX ORDER — WARFARIN SODIUM 2.5 MG/1
0 TABLET ORAL
Qty: 0 | Refills: 0 | DISCHARGE

## 2023-02-17 RX ORDER — DIGOXIN 250 MCG
250 TABLET ORAL DAILY
Refills: 0 | Status: DISCONTINUED | OUTPATIENT
Start: 2023-02-17 | End: 2023-02-18

## 2023-02-17 RX ORDER — LANOLIN ALCOHOL/MO/W.PET/CERES
3 CREAM (GRAM) TOPICAL AT BEDTIME
Refills: 0 | Status: DISCONTINUED | OUTPATIENT
Start: 2023-02-17 | End: 2023-02-18

## 2023-02-17 RX ORDER — HEPARIN SODIUM 5000 [USP'U]/ML
0 INJECTION INTRAVENOUS; SUBCUTANEOUS
Qty: 0 | Refills: 0 | DISCHARGE
Start: 2023-02-17

## 2023-02-17 RX ORDER — CLOPIDOGREL BISULFATE 75 MG/1
75 TABLET, FILM COATED ORAL DAILY
Refills: 0 | Status: DISCONTINUED | OUTPATIENT
Start: 2023-02-17 | End: 2023-02-18

## 2023-02-17 RX ORDER — CLOPIDOGREL BISULFATE 75 MG/1
1 TABLET, FILM COATED ORAL
Qty: 0 | Refills: 0 | DISCHARGE
Start: 2023-02-17

## 2023-02-17 RX ORDER — AZATHIOPRINE 100 MG/1
50 TABLET ORAL
Refills: 0 | Status: DISCONTINUED | OUTPATIENT
Start: 2023-02-17 | End: 2023-02-18

## 2023-02-17 RX ORDER — NITROGLYCERIN 6.5 MG
0.4 CAPSULE, EXTENDED RELEASE ORAL ONCE
Refills: 0 | Status: COMPLETED | OUTPATIENT
Start: 2023-02-17 | End: 2023-02-17

## 2023-02-17 RX ORDER — LISINOPRIL 2.5 MG/1
10 TABLET ORAL DAILY
Refills: 0 | Status: DISCONTINUED | OUTPATIENT
Start: 2023-02-17 | End: 2023-02-17

## 2023-02-17 RX ORDER — METOPROLOL TARTRATE 50 MG
12.5 TABLET ORAL EVERY 12 HOURS
Refills: 0 | Status: DISCONTINUED | OUTPATIENT
Start: 2023-02-17 | End: 2023-02-17

## 2023-02-17 RX ORDER — RAMIPRIL 5 MG
1 CAPSULE ORAL
Qty: 0 | Refills: 0 | DISCHARGE

## 2023-02-17 RX ORDER — HEPARIN SODIUM 5000 [USP'U]/ML
5000 INJECTION INTRAVENOUS; SUBCUTANEOUS ONCE
Refills: 0 | Status: COMPLETED | OUTPATIENT
Start: 2023-02-17 | End: 2023-02-17

## 2023-02-17 RX ORDER — WARFARIN SODIUM 2.5 MG/1
1 TABLET ORAL
Qty: 0 | Refills: 0 | DISCHARGE

## 2023-02-17 RX ORDER — ONDANSETRON 8 MG/1
4 TABLET, FILM COATED ORAL EVERY 8 HOURS
Refills: 0 | Status: DISCONTINUED | OUTPATIENT
Start: 2023-02-17 | End: 2023-02-18

## 2023-02-17 RX ORDER — LISINOPRIL 2.5 MG/1
40 TABLET ORAL DAILY
Refills: 0 | Status: DISCONTINUED | OUTPATIENT
Start: 2023-02-17 | End: 2023-02-18

## 2023-02-17 RX ORDER — ASPIRIN/CALCIUM CARB/MAGNESIUM 324 MG
81 TABLET ORAL DAILY
Refills: 0 | Status: DISCONTINUED | OUTPATIENT
Start: 2023-02-17 | End: 2023-02-18

## 2023-02-17 RX ORDER — DIGOXIN 250 MCG
0 TABLET ORAL
Qty: 0 | Refills: 0 | DISCHARGE

## 2023-02-17 RX ORDER — WARFARIN SODIUM 2.5 MG/1
10 TABLET ORAL DAILY
Refills: 0 | Status: DISCONTINUED | OUTPATIENT
Start: 2023-02-17 | End: 2023-02-17

## 2023-02-17 RX ADMIN — ATORVASTATIN CALCIUM 40 MILLIGRAM(S): 80 TABLET, FILM COATED ORAL at 22:25

## 2023-02-17 RX ADMIN — AZATHIOPRINE 50 MILLIGRAM(S): 100 TABLET ORAL at 22:25

## 2023-02-17 RX ADMIN — HEPARIN SODIUM 1000 UNIT(S)/HR: 5000 INJECTION INTRAVENOUS; SUBCUTANEOUS at 17:12

## 2023-02-17 RX ADMIN — Medication 0.4 MILLIGRAM(S): at 11:10

## 2023-02-17 RX ADMIN — PYRIDOSTIGMINE BROMIDE 60 MILLIGRAM(S): 60 SOLUTION ORAL at 22:35

## 2023-02-17 RX ADMIN — HEPARIN SODIUM 1000 UNIT(S)/HR: 5000 INJECTION INTRAVENOUS; SUBCUTANEOUS at 22:23

## 2023-02-17 RX ADMIN — Medication 250 MICROGRAM(S): at 22:24

## 2023-02-17 RX ADMIN — HEPARIN SODIUM 5000 UNIT(S): 5000 INJECTION INTRAVENOUS; SUBCUTANEOUS at 17:15

## 2023-02-17 NOTE — ED ADULT TRIAGE NOTE - CHIEF COMPLAINT QUOTE
BIB WIFE C/O CHEST PAIN/DISCOMFORT. PT STATES "I THINK I AM HAVING A HEART ATTACK". PT LIFTED THE DOG OVER HIS HEAD WHILE IN BED AND STARTED TO C/O CHEST PAIN.  PMH AFIB, MI -15 YEARS AGO, MYASTHENIA GRAVIS. DENIES SOB/N/V/D/FEVER/CHILLS.  PT SENT DIRECTLY INTO TRAUMA ROOM. EKG AT BEDSIDE. CHARGE ALEX DASILVA.

## 2023-02-17 NOTE — H&P ADULT - NSHPREVIEWOFSYSTEMS_GEN_ALL_CORE
ROS:  General:  No fevers, chills, or unexplained weight loss  Skin: No rash or bothersome skin lesions  Musculoskeletal: No arthalgias, myalgias or joint swelling  Eyes: No visual changes or eye pain  Ears: No hearing loss , otorrhea or ear pain  Nose, Mouth, Throat: No nasal congestion, rhinorrhea, oral lesions, postnasal drip or sore throat  Cardio: see hpi  Respiratory: No cough, shortness of breath or wheezing   GI: No diarrhea, constipation, blood in stools, abdominal pain, vomiting or heartburn  : No urinary frequency, hematuria, incontinence, or dysuria  Neurologic: No headaches, parasthesias, confusion, dysarthria or gait instability  Psychiatric:  No anxiety or depression  Lymphatic:  No easy bruising, easy bleeding or swollen glands  Allergic: No itching, sneezing , watery eyes, clear rhinorrhea or recurrent infections

## 2023-02-17 NOTE — CONSULT NOTE ADULT - SUBJECTIVE AND OBJECTIVE BOX
CHIEF COMPLAINT: had chest pain  today while lifting the dog     HPI: 62 year old male with morbid obesity , myasthenia gravis , HTN chronic afib , sleep apnea non compliant to cpap ,asthma  who developed left sided chest pain sharp/pressure like pain while lifting the dog to put on bed , patient felt it , was radiating to left arm , not related to breathing or movement , no sob , no dizziness , did take nitro , slowly improved after some time , Patient came to ER , normal initial troponin , resolved pain , with subsequent troponin significantly elevated . Patient remain chest pain free , currently comfortable , Patient was diagnosed to have covid positive  , however  patient does not have any symptoms     patient had chronic LE edema , prior hx of cellulitis ,       PAST MEDICAL & SURGICAL HISTORY:  Afib    as above   No significant past surgical history          Allergies    Keflex (Flushing; Urticaria; Rash)  sulfamethoxazole-trimethoprim (Flushing; Urticaria; Rash)  vancomycin (Unknown)    Intolerances        SOCIAL HISTORY:    non smoker       FAMILY HISTORY:   mother  had htn     MEDICATIONS:Home Medications:  Albuterol (Eqv-Proventil HFA) 90 mcg/inh inhalation aerosol: 2 puff(s) inhaled every 6 hours, As Needed (17 Feb 2023 14:19)  azaTHIOprine 50 mg oral tablet: 1 tab(s) orally 3 times a day (17 Feb 2023 14:19)  Coumadin 10 mg oral tablet: 1 tab(s) orally once a day (17 Feb 2023 14:19)  digoxin 250 mcg (0.25 mg) oral tablet: 1 tab(s) orally once a day (17 Feb 2023 14:19)  predniSONE 20 mg oral tablet: 1 tab(s) orally once a day (17 Feb 2023 14:19)  pyridostigmine 60 mg oral tablet: 2 tab(s) orally 2 times a day in the morning and afternoon  ***pt takes 5 tabs daily, 2 in the am, 2 in the afternoon, and 1 in the pm*** (17 Feb 2023 14:19)  pyridostigmine 60 mg oral tablet: 1 tab(s) orally once a day (at bedtime) (17 Feb 2023 14:19)  ramipril 10 mg oral capsule: 1 cap(s) orally once a day (17 Feb 2023 14:19)  Silvadene 1% topical cream: Apply topically to affected area 2 times a day, As Needed (17 Feb 2023 14:19)    MEDICATIONS  (STANDING):    MEDICATIONS  (PRN):      REVIEW OF SYSTEMS:  as above   CONSTITUTIONAL: No weakness, fevers or chills  EYES/ENT: No visual changes;  No vertigo or throat pain   NECK: No pain or stiffness  RESPIRATORY: No cough, wheezing, hemoptysis; No shortness of breath  CARDIOVASCULAR: No chest pain or palpitations  GASTROINTESTINAL: No abdominal or epigastric pain. No nausea, vomiting, or hematemesis; No diarrhea or constipation. No melena or hematochezia.  GENITOURINARY: No dysuria, frequency or hematuria  NEUROLOGICAL: No numbness or weakness  SKIN: No itching, burning, rashes, or lesions   All other review of systems is negative unless indicated above    Vital Signs Last 24 Hrs  T(C): 36.4 (17 Feb 2023 10:04), Max: 36.4 (17 Feb 2023 10:04)  T(F): 97.6 (17 Feb 2023 10:04), Max: 97.6 (17 Feb 2023 10:04)  HR: 85 (17 Feb 2023 11:37) (85 - 86)  BP: 170/97 (17 Feb 2023 11:37) (170/97 - 183/107)  BP(mean): 114 (17 Feb 2023 11:37) (114 - 127)  RR: 20 (17 Feb 2023 11:37) (20 - 20)  SpO2: 100% (17 Feb 2023 11:37) (97% - 100%)    Parameters below as of 17 Feb 2023 11:37  Patient On (Oxygen Delivery Method): nasal cannula  O2 Flow (L/min): 2      I&O's Summary      PHYSICAL EXAM:    Constitutional: NAD, awake and alert, well-developed  HEENT: PERR, EOMI,  No oral cyananosis.  Neck:  supple,  No JVD  Respiratory: Breath sounds are clear bilaterally, No wheezing, rales or rhonchi  Cardiovascular: S1 and S2, IR IR   Gastrointestinal: Bowel Sounds present, soft, nontender.   Extremities: 2 Plus chronic peripheral edema. No clubbing or cyanosis.  Vascular: 2+ peripheral pulses  Neurological: A/O x 3, no focal deficits  Musculoskeletal: no calf tenderness.  Skin: No rashes.      LABS: All Labs Reviewed:                        15.6   11.03 )-----------( 273      ( 17 Feb 2023 09:57 )             49.0     17 Feb 2023 09:57    139    |  108    |  18     ----------------------------<  96     4.1     |  30     |  1.00     Ca    8.9        17 Feb 2023 09:57    TPro  7.2    /  Alb  3.4    /  TBili  1.2    /  DBili  x      /  AST  19     /  ALT  29     /  AlkPhos  47     17 Feb 2023 09:57    PT/INR - ( 17 Feb 2023 09:57 )   PT: 17.6 sec;   INR: 1.51 ratio         PTT - ( 17 Feb 2023 09:57 )  PTT:34.8 sec    - TroponinI hsT: <-1356.86, <-32.06    Blood Culture:         RADIOLOGY/EKG:< from: 12 Lead ECG (02.17.23 @ 09:59) >   Atrial fibrillation  Rightward axis  Non-specific intra-ventricular conduction delay  Possible Anterior infarct , age undetermined  T wave abnormality, consider inferior ischemia  Confirmed by IRAJ HEADLEY MD ([id number]) on 2/17/2023 10:27:53 AM    < end of copied text >

## 2023-02-17 NOTE — ED ADULT NURSE REASSESSMENT NOTE - NS ED NURSE REASSESS COMMENT FT1
As per MD Palla, cardiologist, pt will be transferred to [facility], report given to Yoko GARCIA at [facility], [phone].  spoke to MD jean to fill out the transfer sheet.

## 2023-02-17 NOTE — PATIENT PROFILE ADULT - FUNCTIONAL ASSESSMENT - BASIC MOBILITY 6.
3-calculated by average/Not able to assess (calculate score using [facility] averaging method)

## 2023-02-17 NOTE — CONSULT NOTE ADULT - PROBLEM SELECTOR RECOMMENDATION 9
chest pain sudden onset with increased troponin , currently chest pain free , ? uncontrolled hypertension ,   will give statin , ecotrin , start on heparin INR <1.51 , will obtain echo , NO BB due to asthma and myasthenia

## 2023-02-17 NOTE — DISCHARGE NOTE PROVIDER - HOSPITAL COURSE
63 y/o male with PMHx of MG on chronic prednisone 20 mg daily, obesity, CAD, HTN, permanent AFIB on coumadin and chronic LE wounds who presented to  with CC of CP.  Patient notes he was lifting his dog earlier this morning and right after he put down his dog, he developed severe CP.  Pain felt like pressure.  Pain radiated down his left arm.  Pain started around 9 or 10 am and persisted until he got to the ER.  In the ER, he was given nitro SL and pain improved.  1st trop was negative but repeat was 1357 c/w NSTEMI.  EKG with some ST/ t wave ischemic changes but no ST elevation.  Patient's cardiologist at [place]-- Dr. Gracia.  Contacted in ER by Dr. Palla and willing to accept patient for transfer.  INR was subtherapeutic and patient started on heparin gtt as well.  INR 1.5.      Patient has had ongoing issues with cellulitis/ lower extremity wounds on legs.  Also had trauma from his dog recently causing left LE wound.      Vital Signs Last 24 Hrs  T(C): 36.4 (17 Feb 2023 10:04), Max: 36.4 (17 Feb 2023 10:04)  T(F): 97.6 (17 Feb 2023 10:04), Max: 97.6 (17 Feb 2023 10:04)  HR: 82 (17 Feb 2023 16:45) (82 - 86)  BP: 157/79 (17 Feb 2023 16:45) (134/65 - 183/107)  BP(mean): 114 (17 Feb 2023 11:37) (114 - 127)  RR: 20 (17 Feb 2023 16:45) (20 - 20)  SpO2: 100% (17 Feb 2023 16:45) (97% - 100%)    Parameters below as of 17 Feb 2023 16:45  Patient On (Oxygen Delivery Method): nasal cannula  O2 Flow (L/min): 2      General: obese male.  NAD.    Skin: no rash or prominent lesions  Head: normocephalic, atraumatic     Sinuses: non-tender  Nose: no external lesions, mucosa non-inflamed, septum and turbinates normal  Throat: no erythema, exudates or lesions.  Neck: Supple without lymphadenopathy. Thyroid no thyromegaly, no palpable thyroid nodules, no palpable nodules or masses, carotid arteries no bruits.   Breasts: No palpable masses or lesions.  Heart: RRR, no murmur or gallop.  Normal S1, S2.  No S3, S4.   Lungs: CTA bilaterally, no wheezes, rhonchi, rales.  Breathing unlabored.   Chest wall: Normal insp   Abdomen:  Soft, NT/ND, normal bowel sounds, no HSM, no masses.  No peritoneal signs.   Back: spine normal without deformity or tenderness.  Normal ROM   : Exam normal.  no inguinal hernias.  Extremities: 1-2 + Edema b/l legs.    Left lower ext with mild warmth/ erythema.  open wound left lateral leg and left medial leg.    Musculoskeletal: Normal gait and station. No decreased range of motion, instability, atrophy or abnormal strength or tone in the head, neck, spine, ribs, pelvis or extremities.   Neurologic: CN 2-12 normal. Sensation to pain, touch and proprioception normal. DTRs normal in upper and lower extremities. No pathologic reflexes.  Motor normal.  Psychiatric: Oriented X3, intact recent and remote memory, judgement and insight, normal mood and affect.    #CP with NSTEMI:    Trend trop.    ASA/ heparin gtt.    For transfer to [facility]-- Dr. Gracia.      #Uncontrolled HTN:    Resume home ACE.    Titrate meds.    Avoid BB with MG.      #+COVID:    Asymptomatic    #MG:    Resume home meds.  pyridostigmine/ prednisone.      #LE Wounds:    Left lower extremity with open wound.    Wound care consult.    Trend WBC and temps.    Hold on ABX for now.    Patient states just completed PO ABX.      #Permanent AFIB:    Cont dig.    Hold coumadin in prep for ? CATH.    Heparin gtt.      #Obesity:    Nutrition eval.      Patient stable for transfer to [place] 62M w/ morbid obesity, HTN, chronic afib, BUDDY non-adherent to CPAP, asthma and myasthenia gravis, presented 2/17 with c/o L sided chest pain, sharp/pressure-like, while lifting the dog to put on bed, felt it radiating to left arm, not related to breathing or movement. No SOB , no dizziness. Did take nitro, slowly improved after some time. In the ED,  /100, HR 86, RR and O2 WNL. Afebrile. Exam notable for B/L LE chronic edema and skin changes. Normal initial troponin but  with subsequent troponin significantly elevated. Incidentally COVID19 positive. This AM patient  developed  similar chest pain, L sided, pressure-like, with elevated blood pressure. Given NTG. Patient is for transfer to [place], Dr. Gracia. Continue IV heparin, Plavix, nitro patch, EKG.  Patient has had ongoing issues with cellulitis, lower extremity wounds on legs. Also had trauma from his dog recently causing left LE wound.      NSTEMI  Continue heparin drip, ASA, Plavix, statin, ACEI, nitro prn. For transfer to [facility]-- Dr. Gracia.      Uncontrolled HTN  Resume home lisinopril. Titrate meds.   Avoid BB with MG.      Permanent Afib    On digoxin. Coumadin held in preparation for probable cath. On heparin drip.     COVID19+  Incidentally detected. Asymptomatic from COVID19 standpoint. Continue to monitor.     Myasthenia gravis  Stable. Continue home pyridostigmine, prednisone.      Left lower extremity with open wound  Wound care consult. Trend WBC and temps. Hold on ABX for now.  Patient states just completed PO ABX course.    Obesity  Nutrition eval    Patient stable for transfer to [place]    Vital Signs Last 24 Hrs  T(F): 97.7 (18 Feb 2023 08:13), Max: 98.2 (17 Feb 2023 21:06)  HR: 92 (18 Feb 2023 08:13) (82 - 92)  BP: 143/94 (18 Feb 2023 08:50) (115/79 - 183/107)  RR: 20 (18 Feb 2023 08:13) (18 - 20)  SpO2: 97% (18 Feb 2023 08:13) (97% - 100%) on room air    Exam:  General: obese male.  NAD.    Skin: no rash or prominent lesions  Head: normocephalic, atraumatic     Sinuses: non-tender  Nose: no external lesions, mucosa non-inflamed, septum and turbinates normal  Throat: no erythema, exudates or lesions.  Neck: Supple without lymphadenopathy. Thyroid no thyromegaly, no palpable thyroid nodules, no palpable nodules or masses, carotid arteries no bruits.   Breasts: No palpable masses or lesions.  Heart: RRR, no murmur or gallop.  Normal S1, S2.  No S3, S4.   Lungs: CTA bilaterally, no wheezes, rhonchi, rales.  Breathing unlabored.   Chest wall: Normal insp   Abdomen:  Soft, NT/ND, normal bowel sounds, no HSM, no masses.  No peritoneal signs.   Back: spine normal without deformity or tenderness.  Normal ROM   : Exam normal.  no inguinal hernias.  Extremities: 1-2 + Edema b/l legs.    Left lower ext with mild warmth/ erythema.  open wound left lateral leg and left medial leg.    Musculoskeletal: Normal gait and station. No decreased range of motion, instability, atrophy or abnormal strength or tone in the head, neck, spine, ribs, pelvis or extremities.   Neurologic: CN 2-12 normal. Sensation to pain, touch and proprioception normal. DTRs normal in upper and lower extremities. No pathologic reflexes.  Motor normal.  Psychiatric: Oriented X3, intact recent and remote memory, judgement and insight, normal mood and affect.    Current Inpatient Meds:  aspirin enteric coated 81 milliGRAM(s) Oral daily  atorvastatin 40 milliGRAM(s) Oral at bedtime  azaTHIOprine 50 milliGRAM(s) Oral <User Schedule>  clopidogrel Tablet 75 milliGRAM(s) Oral daily  digoxin     Tablet 250 MICROGram(s) Oral daily  heparin   Injectable 5000 Unit(s) IV Push once  heparin  Infusion.  Unit(s)/Hr (10 mL/Hr) IV Continuous <Continuous>  lisinopril 40 milliGRAM(s) Oral daily  predniSONE   Tablet 20 milliGRAM(s) Oral daily  pyridostigmine 120 milliGRAM(s) Oral two times a day  pyridostigmine 60 milliGRAM(s) Oral at bedtime

## 2023-02-17 NOTE — DISCHARGE NOTE PROVIDER - NSDCCPCAREPLAN_GEN_ALL_CORE_FT
PRINCIPAL DISCHARGE DIAGNOSIS  Diagnosis: NSTEMI (non-ST elevation myocardial infarction)  Assessment and Plan of Treatment: further management as per [facility]      SECONDARY DISCHARGE DIAGNOSES  Diagnosis: 2019 novel coronavirus disease (COVID-19)  Assessment and Plan of Treatment:      PRINCIPAL DISCHARGE DIAGNOSIS  Diagnosis: NSTEMI (non-ST elevation myocardial infarction)  Assessment and Plan of Treatment: Continue heparin drip, ASA, Plavix, statin, ACEI, nitro prn. For transfer to [facility]-- Dr. Gracia.      SECONDARY DISCHARGE DIAGNOSES  Diagnosis: HTN (hypertension)  Assessment and Plan of Treatment: Continue ACEI. Titrate meds.  Avoid BB with MG.    Diagnosis: Chronic atrial fibrillation  Assessment and Plan of Treatment: On digoxin. Coumadin held in preparation for probable cath. On heparin drip.    Diagnosis: 2019 novel coronavirus disease (COVID-19)  Assessment and Plan of Treatment: Incidentally detected. Asymptomatic from COVID19 standpoint. Continue to monitor.       Diagnosis: Myasthenia gravis  Assessment and Plan of Treatment: Myasthenia gravis  Stable. Continue home pyridostigmine, prednisone.    Diagnosis: Leg wound, left  Assessment and Plan of Treatment: Wound care consult. Trend WBC and temps. Hold on ABX for now.  Patient states just completed PO ABX course.

## 2023-02-17 NOTE — ED ADULT NURSE NOTE - OBJECTIVE STATEMENT
pt is 61 yo male presents to ED c/o left side chest pain radiating down to left arm.  pt reports the pain 9/10, acute onset started after lifting his dog this morning. PMH AFIB, MI, and MG.  Afib noted on monitor .

## 2023-02-17 NOTE — PHARMACOTHERAPY INTERVENTION NOTE - COMMENTS
Medication reconciliation completed.  Reviewed Medication list and confirmed med allergies with patient; confirmed with Dr. First Medrocael.

## 2023-02-17 NOTE — H&P ADULT - NSHPLABSRESULTS_GEN_ALL_CORE
15.6   11.03 )-----------( 273      ( 17 Feb 2023 09:57 )             49.0     02-17    139  |  108  |  18  ----------------------------<  96  4.1   |  30  |  1.00    Ca    8.9      17 Feb 2023 09:57    TPro  7.2  /  Alb  3.4  /  TBili  1.2  /  DBili  x   /  AST  19  /  ALT  29  /  AlkPhos  47  02-17    CAPILLARY BLOOD GLUCOSE        PT/INR - ( 17 Feb 2023 09:57 )   PT: 17.6 sec;   INR: 1.51 ratio         PTT - ( 17 Feb 2023 09:57 )  PTT:34.8 sec    Troponin I, High Sensitivity Result: 1356.86

## 2023-02-17 NOTE — ED PROVIDER NOTE - PROGRESS NOTE DETAILS
Julianna Armstrong for attending Dr. Toussaint:  Discussed w/ Dr. Quiñonez who asks for D-dimer, will come and see pt. Received signout on this patient who had been admitted to hospitalist service.  Found to have elevated troponin.  Dr. Palla made aware. D dimer negative. Spenser An MD.

## 2023-02-17 NOTE — H&P ADULT - NSHPPHYSICALEXAM_GEN_ALL_CORE
PEx  T(C): 36.4 (02-17-23 @ 10:04), Max: 36.4 (02-17-23 @ 10:04)  HR: 85 (02-17-23 @ 11:37) (85 - 86)  BP: 170/97 (02-17-23 @ 11:37) (170/97 - 183/107)  RR: 20 (02-17-23 @ 11:37) (20 - 20)  SpO2: 100% (02-17-23 @ 11:37) (97% - 100%)    General: obese male.  NAD.    Skin: no rash or prominent lesions  Head: normocephalic, atraumatic     Sinuses: non-tender  Nose: no external lesions, mucosa non-inflamed, septum and turbinates normal  Throat: no erythema, exudates or lesions.  Neck: Supple without lymphadenopathy. Thyroid no thyromegaly, no palpable thyroid nodules, no palpable nodules or masses, carotid arteries no bruits.   Breasts: No palpable masses or lesions.  Heart: RRR, no murmur or gallop.  Normal S1, S2.  No S3, S4.   Lungs: CTA bilaterally, no wheezes, rhonchi, rales.  Breathing unlabored.   Chest wall: Normal insp   Abdomen:  Soft, NT/ND, normal bowel sounds, no HSM, no masses.  No peritoneal signs.   Back: spine normal without deformity or tenderness.  Normal ROM   : Exam normal.  no inguinal hernias.  Extremities: 1-2 + Edema b/l legs.    Left lower ext with mild warmth/ erythema.  open wound left lateral leg and left medial leg.    Musculoskeletal: Normal gait and station. No decreased range of motion, instability, atrophy or abnormal strength or tone in the head, neck, spine, ribs, pelvis or extremities.   Neurologic: CN 2-12 normal. Sensation to pain, touch and proprioception normal. DTRs normal in upper and lower extremities. No pathologic reflexes.  Motor normal.  Psychiatric: Oriented X3, intact recent and remote memory, judgement and insight, normal mood and affect.

## 2023-02-17 NOTE — H&P ADULT - ASSESSMENT
61 y/o male with PMHx of MG on chronic prednisone 20 mg daily, obesity, CAD, HTN, permanent AFIB on coumadin and chronic LE wounds who presented to  with CC of CP.  1st trop was negative but repeat was 1357 c/w NSTEMI.  EKG with some ST/ t wave ischemic changes but no ST elevation.     #CP with NSTEMI:    Admit to tele.                 Cardio eval appreciated.    Trend trop-- 32--> 1357.  Repeat now.    ECHO done in ER-- no obvious wall motion abn.    Heparin gtt.    ASA.    Start statin.    Serial EKGs.    Needs CATH.    Dr. Palla d/w Dr. Gracia @ [place].  Agreeable to transfer patient to [facility].    Unclear if will be tonight or tomorrow.      #Uncontrolled HTN:    Resume home ACE.    Titrate meds.    Avoid BB with MG.      #MG:    Resume home meds.  pyridostigmine/ prednisone.      #LE Wounds:    Left lower extremity with open wound.    Wound care consult.    Trend WBC and temps.    Hold on ABX for now.    Patient states just completed PO ABX.      #Permanent AFIB:    Cont dig.    Hold coumadin in prep for ? CATH.    Heparin gtt.      #Obesity:    Nutrition eval.      #DVT Proph:  heparin gtt.     61 y/o male with PMHx of MG on chronic prednisone 20 mg daily, obesity, CAD, HTN, permanent AFIB on coumadin and chronic LE wounds who presented to  with CC of CP.  1st trop was negative but repeat was 1357 c/w NSTEMI.  EKG with some ST/ t wave ischemic changes but no ST elevation.     #CP with NSTEMI:    Admit to tele.                 Cardio eval appreciated.    Trend trop-- 32--> 1357.  Repeat now.    ECHO done in ER-- no obvious wall motion abn.    Heparin gtt.    ASA.    Start statin.    Serial EKGs.    Needs CATH.    Dr. Palla d/w Dr. Gracia @ [place].  Agreeable to transfer patient to [facility].    Unclear if will be tonight or tomorrow.      #Uncontrolled HTN:    Resume home ACE.    Titrate meds.    Avoid BB with MG.      #COVID:    Patient with no symptoms.    Vaccinated.      #MG:    Resume home meds.  pyridostigmine/ prednisone.      #LE Wounds:    Left lower extremity with open wound.    Wound care consult.    Trend WBC and temps.    Hold on ABX for now.    Patient states just completed PO ABX.      #Permanent AFIB:    Cont dig.    Hold coumadin in prep for ? CATH.    Heparin gtt.      #Obesity:    Nutrition eval.      #DVT Proph:  heparin gtt.

## 2023-02-17 NOTE — ED PROVIDER NOTE - OBJECTIVE STATEMENT
63 y/o male w/ a PMHx of Afib, MI (approx 15 years ago), and myasthenia gravis presents to the ED c/o CP. Pt reports this morning he was lifting his dog onto the bed when he had a sudden onset of moderate to severe CP that radiating down his right arm, states the pain feels similar to his previous MI. Pt notes he did have a recent cardiac stress test which was normal. Denies SOB, n/v/d, fever, chills, or recent illness. Pt on Warfarin. Nonsmoker.

## 2023-02-17 NOTE — DISCHARGE NOTE PROVIDER - NSDCMRMEDTOKEN_GEN_ALL_CORE_FT
Albuterol (Eqv-Proventil HFA) 90 mcg/inh inhalation aerosol: 2 puff(s) inhaled every 6 hours, As Needed  aspirin 81 mg oral delayed release tablet: 1 tab(s) orally once a day  atorvastatin 40 mg oral tablet: 1 tab(s) orally once a day (at bedtime)  azaTHIOprine 50 mg oral tablet: 1 tab(s) orally 3 times a day  digoxin 250 mcg (0.25 mg) oral tablet: 1 tab(s) orally once a day  heparin 100 units/mL-D5% intravenous solution: intravenous every minute  lisinopril 40 mg oral tablet: 1 tab(s) orally once a day  predniSONE 20 mg oral tablet: 1 tab(s) orally once a day  pyridostigmine 60 mg oral tablet: 2 tab(s) orally 2 times a day in the morning and afternoon  ***pt takes 5 tabs daily, 2 in the am, 2 in the afternoon, and 1 in the pm***  pyridostigmine 60 mg oral tablet: 1 tab(s) orally once a day (at bedtime)  Silvadene 1% topical cream: Apply topically to affected area 2 times a day, As Needed   Albuterol (Eqv-Proventil HFA) 90 mcg/inh inhalation aerosol: 2 puff(s) inhaled every 6 hours, As Needed  aspirin 81 mg oral delayed release tablet: 1 tab(s) orally once a day  atorvastatin 40 mg oral tablet: 1 tab(s) orally once a day (at bedtime)  azaTHIOprine 50 mg oral tablet: 1 tab(s) orally 3 times a day  clopidogrel 75 mg oral tablet: 1 tab(s) orally once a day  digoxin 250 mcg (0.25 mg) oral tablet: 1 tab(s) orally once a day  heparin 100 units/mL-D5% intravenous solution: intravenous every minute  lisinopril 40 mg oral tablet: 1 tab(s) orally once a day  predniSONE 20 mg oral tablet: 1 tab(s) orally once a day  pyridostigmine 60 mg oral tablet: 2 tab(s) orally 2 times a day in the morning and afternoon  ***pt takes 5 tabs daily, 2 in the am, 2 in the afternoon, and 1 in the pm***  pyridostigmine 60 mg oral tablet: 1 tab(s) orally once a day (at bedtime)  Silvadene 1% topical cream: Apply topically to affected area 2 times a day, As Needed

## 2023-02-17 NOTE — ED ADULT NURSE NOTE - NSIMPLEMENTINTERV_GEN_ALL_ED
Implemented All Fall with Harm Risk Interventions:  [place] to call system. Call bell, personal items and telephone within reach. Instruct patient to call for assistance. Room bathroom lighting operational. Non-slip footwear when patient is off stretcher. Physically safe environment: no spills, clutter or unnecessary equipment. Stretcher in lowest position, wheels locked, appropriate side rails in place. Provide visual cue, wrist band, yellow gown, etc. Monitor gait and stability. Monitor for mental status changes and reorient to person, place, and time. Review medications for side effects contributing to fall risk. Reinforce activity limits and safety measures with patient and family. Provide visual clues: red socks.

## 2023-02-17 NOTE — ED PROVIDER NOTE - CLINICAL SUMMARY MEDICAL DECISION MAKING FREE TEXT BOX
63 y/o male w/ a PMHx of Afib, MI (approx 15 years ago), and myasthenia gravis presents to the ED c/o CP after lifting dog. Likely stable angina, EKG not concerning for STEMI at this time. will obtain cardiac enzyme, give nitroglycerine, and reassess closely.

## 2023-02-17 NOTE — PATIENT PROFILE ADULT - FALL HARM RISK - HARM RISK INTERVENTIONS

## 2023-02-17 NOTE — DISCHARGE NOTE PROVIDER - NPI NUMBER (FOR SYSADMIN USE ONLY) :
[[id number]] Quinolones Counseling:  I discussed with the patient the risks of fluoroquinolones including but not limited to GI upset, allergic reaction, drug rash, diarrhea, dizziness, photosensitivity, yeast infections, liver function test abnormalities, tendonitis/tendon rupture.

## 2023-02-17 NOTE — CHART NOTE - NSCHARTNOTEFT_GEN_A_CORE
Hospitalist Addendum to H&P:    Repeat 3rd trop 7100.    In addition to ASA and heparin gtt, will also add plavix for NSTEMI.    Patient awaiting transfer to [facility].

## 2023-02-17 NOTE — H&P ADULT - HISTORY OF PRESENT ILLNESS
63 y/o male with PMHx of MG on chronic prednisone 20 mg daily, obesity, CAD, HTN, permanent AFIB on coumadin and chronic LE wounds who presented to  with CC of CP.  Patient notes he was lifting his dog earlier this morning and right after he put down his dog, he developed severe CP.  Pain felt like pressure.  Pain radiated down his left arm.  Pain started around 9 or 10 am and persisted until he got to the ER.  In the ER, he was given nitro SL and pain improved.  1st trop was negative but repeat was 1357 c/w NSTEMI.  EKG with some ST/ t wave ischemic changes but no ST elevation.  Patient's cardiologist at [facility]-- Dr. Gracia.  Contacted in ER by Dr. Palla and willing to accept patient for transfer.  INR was subtherapeutic and patient started on heparin gtt as well.  INR 1.5.      Patient has had ongoing issues with cellulitis/ lower extremity wounds on legs.  Also had trauma from his dog recently causing left LE wound.        PAST MEDICAL & SURGICAL HISTORY:  Afib      FAMILY HISTORY:  Family hx of HTN      Social History:    Denies tob use.    Lives at home with family.    Has dog.        Allergies:  Keflex (Flushing; Urticaria; Rash)  sulfamethoxazole-trimethoprim (Flushing; Urticaria; Rash)  vancomycin (Unknown)

## 2023-02-17 NOTE — ED PROVIDER NOTE - PHYSICAL EXAMINATION
Constitutional: Obese adult male laying in bed in moderate distress, AAOx3  Eyes: PERRLA EOMI  Head: Normocephalic atraumatic  Mouth: MMM  Cardiac: regular rate   Resp: Lungs CTAB  GI: Abd s/nt/nd, no rebound or guarding.  Neuro: awake, alert, moving all extremities, cranial nerves 2-12 intact, sensation intact, no dysmetria.  Skin: No rashes

## 2023-02-17 NOTE — DISCHARGE NOTE PROVIDER - CARE PROVIDER_API CALL
Manuel Gracia  Cardiovascular Diseases  [address] [place] [id number]  Phone: [phone]  Fax: [phone]  Follow Up Time: 1 week

## 2023-02-18 ENCOUNTER — TRANSCRIPTION ENCOUNTER (OUTPATIENT)
Age: 63
End: 2023-02-18

## 2023-02-18 VITALS — SYSTOLIC BLOOD PRESSURE: 143 MMHG | DIASTOLIC BLOOD PRESSURE: 94 MMHG

## 2023-02-18 LAB
ANION GAP SERPL CALC-SCNC: 3 MMOL/L — LOW (ref 5–17)
APTT BLD: 50.7 SEC — HIGH (ref 27.5–35.5)
APTT BLD: 53.5 SEC — HIGH (ref 27.5–35.5)
BUN SERPL-MCNC: 16 MG/DL — SIGNIFICANT CHANGE UP (ref 7–23)
CALCIUM SERPL-MCNC: 8.5 MG/DL — SIGNIFICANT CHANGE UP (ref 8.5–10.1)
CHLORIDE SERPL-SCNC: 108 MMOL/L — SIGNIFICANT CHANGE UP (ref 96–108)
CHOLEST SERPL-MCNC: 171 MG/DL — SIGNIFICANT CHANGE UP
CO2 SERPL-SCNC: 29 MMOL/L — SIGNIFICANT CHANGE UP (ref 22–31)
CREAT SERPL-MCNC: 0.82 MG/DL — SIGNIFICANT CHANGE UP (ref 0.5–1.3)
EGFR: 99 ML/MIN/1.73M2 — SIGNIFICANT CHANGE UP
GLUCOSE SERPL-MCNC: 95 MG/DL — SIGNIFICANT CHANGE UP (ref 70–99)
HCT VFR BLD CALC: 45.7 % — SIGNIFICANT CHANGE UP (ref 39–50)
HCV AB S/CO SERPL IA: 0.21 S/CO — SIGNIFICANT CHANGE UP (ref 0–0.99)
HCV AB SERPL-IMP: SIGNIFICANT CHANGE UP
HDLC SERPL-MCNC: 65 MG/DL — SIGNIFICANT CHANGE UP
HGB BLD-MCNC: 14.6 G/DL — SIGNIFICANT CHANGE UP (ref 13–17)
INR BLD: 1.76 RATIO — HIGH (ref 0.88–1.16)
LIPID PNL WITH DIRECT LDL SERPL: 87 MG/DL — SIGNIFICANT CHANGE UP
MCHC RBC-ENTMCNC: 30.2 PG — SIGNIFICANT CHANGE UP (ref 27–34)
MCHC RBC-ENTMCNC: 31.9 GM/DL — LOW (ref 32–36)
MCV RBC AUTO: 94.4 FL — SIGNIFICANT CHANGE UP (ref 80–100)
NON HDL CHOLESTEROL: 106 MG/DL — SIGNIFICANT CHANGE UP
PLATELET # BLD AUTO: 226 K/UL — SIGNIFICANT CHANGE UP (ref 150–400)
POTASSIUM SERPL-MCNC: 4.1 MMOL/L — SIGNIFICANT CHANGE UP (ref 3.5–5.3)
POTASSIUM SERPL-SCNC: 4.1 MMOL/L — SIGNIFICANT CHANGE UP (ref 3.5–5.3)
PROTHROM AB SERPL-ACNC: 20.5 SEC — HIGH (ref 10.5–13.4)
RBC # BLD: 4.84 M/UL — SIGNIFICANT CHANGE UP (ref 4.2–5.8)
RBC # FLD: 13.4 % — SIGNIFICANT CHANGE UP (ref 10.3–14.5)
SODIUM SERPL-SCNC: 140 MMOL/L — SIGNIFICANT CHANGE UP (ref 135–145)
TRIGL SERPL-MCNC: 98 MG/DL — SIGNIFICANT CHANGE UP
TROPONIN I, HIGH SENSITIVITY RESULT: 4921.64 NG/L — HIGH
WBC # BLD: 10.38 K/UL — SIGNIFICANT CHANGE UP (ref 3.8–10.5)
WBC # FLD AUTO: 10.38 K/UL — SIGNIFICANT CHANGE UP (ref 3.8–10.5)

## 2023-02-18 PROCEDURE — 99233 SBSQ HOSP IP/OBS HIGH 50: CPT

## 2023-02-18 PROCEDURE — 93010 ELECTROCARDIOGRAM REPORT: CPT

## 2023-02-18 PROCEDURE — 99239 HOSP IP/OBS DSCHRG MGMT >30: CPT

## 2023-02-18 RX ORDER — HEPARIN SODIUM 5000 [USP'U]/ML
6000 INJECTION INTRAVENOUS; SUBCUTANEOUS EVERY 6 HOURS
Refills: 0 | Status: DISCONTINUED | OUTPATIENT
Start: 2023-02-18 | End: 2023-02-18

## 2023-02-18 RX ORDER — HEPARIN SODIUM 5000 [USP'U]/ML
INJECTION INTRAVENOUS; SUBCUTANEOUS
Qty: 25000 | Refills: 0 | Status: DISCONTINUED | OUTPATIENT
Start: 2023-02-18 | End: 2023-02-18

## 2023-02-18 RX ORDER — NITROGLYCERIN 6.5 MG
0.5 CAPSULE, EXTENDED RELEASE ORAL ONCE
Refills: 0 | Status: COMPLETED | OUTPATIENT
Start: 2023-02-18 | End: 2023-02-18

## 2023-02-18 RX ORDER — HEPARIN SODIUM 5000 [USP'U]/ML
5000 INJECTION INTRAVENOUS; SUBCUTANEOUS ONCE
Refills: 0 | Status: DISCONTINUED | OUTPATIENT
Start: 2023-02-18 | End: 2023-02-18

## 2023-02-18 RX ORDER — NITROGLYCERIN 6.5 MG
0.4 CAPSULE, EXTENDED RELEASE ORAL
Refills: 0 | Status: COMPLETED | OUTPATIENT
Start: 2023-02-18 | End: 2023-02-18

## 2023-02-18 RX ADMIN — Medication 20 MILLIGRAM(S): at 09:40

## 2023-02-18 RX ADMIN — PYRIDOSTIGMINE BROMIDE 120 MILLIGRAM(S): 60 SOLUTION ORAL at 06:12

## 2023-02-18 RX ADMIN — LISINOPRIL 40 MILLIGRAM(S): 2.5 TABLET ORAL at 08:30

## 2023-02-18 RX ADMIN — CLOPIDOGREL BISULFATE 75 MILLIGRAM(S): 75 TABLET, FILM COATED ORAL at 09:40

## 2023-02-18 RX ADMIN — HEPARIN SODIUM 1200 UNIT(S)/HR: 5000 INJECTION INTRAVENOUS; SUBCUTANEOUS at 07:32

## 2023-02-18 RX ADMIN — Medication 0.4 MILLIGRAM(S): at 08:31

## 2023-02-18 RX ADMIN — Medication 0.4 MILLIGRAM(S): at 08:44

## 2023-02-18 RX ADMIN — HEPARIN SODIUM 1200 UNIT(S)/HR: 5000 INJECTION INTRAVENOUS; SUBCUTANEOUS at 07:35

## 2023-02-18 RX ADMIN — HEPARIN SODIUM 1000 UNIT(S)/HR: 5000 INJECTION INTRAVENOUS; SUBCUTANEOUS at 00:49

## 2023-02-18 RX ADMIN — Medication 650 MILLIGRAM(S): at 06:12

## 2023-02-18 RX ADMIN — Medication 650 MILLIGRAM(S): at 07:01

## 2023-02-18 RX ADMIN — Medication 0.5 INCH(S): at 08:36

## 2023-02-18 RX ADMIN — AZATHIOPRINE 50 MILLIGRAM(S): 100 TABLET ORAL at 06:13

## 2023-02-18 RX ADMIN — Medication 81 MILLIGRAM(S): at 09:40

## 2023-02-18 RX ADMIN — Medication 250 MICROGRAM(S): at 09:40

## 2023-02-18 RX ADMIN — Medication 0.4 MILLIGRAM(S): at 08:25

## 2023-02-18 NOTE — PROGRESS NOTE ADULT - SUBJECTIVE AND OBJECTIVE BOX
CHIEF COMPLAINT: had chest pain  today while lifting the dog     HPI: 62 year old male with morbid obesity , myasthenia gravis , HTN chronic afib , sleep apnea non compliant to cpap ,asthma  who developed left sided chest pain sharp/pressure like pain while lifting the dog to put on bed , patient felt it , was radiating to left arm , not related to breathing or movement , no sob , no dizziness , did take nitro , slowly improved after some time , Patient came to ER , normal initial troponin , resolved pain , with subsequent troponin significantly elevated . Patient remain chest pain free , currently comfortable , Patient was diagnosed to have covid positive  , however  patient does not have any symptoms     patient had chronic LE edema , prior hx of cellulitis ,     2/18/22   Patient  developed  similar chest pain  left sided pressure like , with elevated blood pressure 10 minutes ago ,  Patient was suppose to get transferred to [facility]  ( did speak to dr diaz yesterday )  again called this morning ,    patient was given nitroglycerin  , bp 160/94       PAST MEDICAL & SURGICAL HISTORY:  Afib    as above   No significant past surgical history          Allergies    Keflex (Flushing; Urticaria; Rash)  sulfamethoxazole-trimethoprim (Flushing; Urticaria; Rash)  vancomycin (Unknown)    Intolerances        SOCIAL HISTORY:    non smoker       FAMILY HISTORY:   mother  had htn     MEDICATIONS:Home Medications:  Albuterol (Eqv-Proventil HFA) 90 mcg/inh inhalation aerosol: 2 puff(s) inhaled every 6 hours, As Needed (17 Feb 2023 14:19)  azaTHIOprine 50 mg oral tablet: 1 tab(s) orally 3 times a day (17 Feb 2023 14:19)  Coumadin 10 mg oral tablet: 1 tab(s) orally once a day (17 Feb 2023 14:19)  digoxin 250 mcg (0.25 mg) oral tablet: 1 tab(s) orally once a day (17 Feb 2023 14:19)  predniSONE 20 mg oral tablet: 1 tab(s) orally once a day (17 Feb 2023 14:19)  pyridostigmine 60 mg oral tablet: 2 tab(s) orally 2 times a day in the morning and afternoon  ***pt takes 5 tabs daily, 2 in the am, 2 in the afternoon, and 1 in the pm*** (17 Feb 2023 14:19)  pyridostigmine 60 mg oral tablet: 1 tab(s) orally once a day (at bedtime) (17 Feb 2023 14:19)  ramipril 10 mg oral capsule: 1 cap(s) orally once a day (17 Feb 2023 14:19)  Silvadene 1% topical cream: Apply topically to affected area 2 times a day, As Needed (17 Feb 2023 14:19)    MEDICATIONS  (STANDING):    MEDICATIONS  (PRN):      REVIEW OF SYSTEMS:  as above   CONSTITUTIONAL: No weakness, fevers or chills  EYES/ENT: No visual changes;  No vertigo or throat pain   NECK: No pain or stiffness  RESPIRATORY: No cough, wheezing, hemoptysis; No shortness of breath  CARDIOVASCULAR:  + this morning chest pain or palpitations  GASTROINTESTINAL: No abdominal or epigastric pain. No nausea, vomiting, or hematemesis; No diarrhea or constipation. No melena or hematochezia.  GENITOURINARY: No dysuria, frequency or hematuria  NEUROLOGICAL: No numbness or weakness  SKIN: No itching, burning, rashes, or lesions   All other review of systems is negative unless indicated above    Vital Signs Last 24 Hrs  T(C): 36.4 (17 Feb 2023 10:04), Max: 36.4 (17 Feb 2023 10:04)  T(F): 97.6 (17 Feb 2023 10:04), Max: 97.6 (17 Feb 2023 10:04)  HR: 85 (17 Feb 2023 11:37) (85 - 86)  BP: 170/97 (17 Feb 2023 11:37) (170/97 - 183/107)   2/18/23  /93   BP(mean): 114 (17 Feb 2023 11:37) (114 - 127)  RR: 20 (17 Feb 2023 11:37) (20 - 20)  SpO2: 100% (17 Feb 2023 11:37) (97% - 100%)    Parameters below as of 17 Feb 2023 11:37  Patient On (Oxygen Delivery Method): nasal cannula  O2 Flow (L/min): 2      I&O's Summary      PHYSICAL EXAM:    Constitutional: NAD, awake and alert, well-developed  HEENT: PERR, EOMI,  No oral cyananosis.  Neck:  supple,  No JVD  Respiratory: Breath sounds are clear bilaterally, No wheezing, rales or rhonchi  Cardiovascular: S1 and S2, IR IR   Gastrointestinal: Bowel Sounds present, soft, nontender.   Extremities: 2 Plus chronic peripheral edema. No clubbing or cyanosis.  Vascular: 2+ peripheral pulses  Neurological: A/O x 3, no focal deficits  Musculoskeletal: no calf tenderness.  Skin: No rashes.      LABS: All Labs Reviewed:                                     14.6   10.38 )-----------( 226      ( 18 Feb 2023 06:19 )             45.7     02-18    140  |  108  |  16  ----------------------------<  95  4.1   |  29  |  0.82    Ca    8.5      18 Feb 2023 06:19    TPro  7.2  /  Alb  3.4  /  TBili  1.2  /  DBili  x   /  AST  19  /  ALT  29  /  AlkPhos  47  02-17        LIVER FUNCTIONS - ( 17 Feb 2023 09:57 )  Alb: 3.4 g/dL / Pro: 7.2 gm/dL / ALK PHOS: 47 U/L / ALT: 29 U/L / AST: 19 U/L / GGT: x           PT/INR - ( 18 Feb 2023 06:19 )   PT: 20.5 sec;   INR: 1.76 ratio         PTT - ( 18 Feb 2023 06:19 )  PTT:50.7 sec            RADIOLOGY/EKG:< from: 12 Lead ECG (02.17.23 @ 09:59) >   Atrial fibrillation  Rightward axis  Non-specific intra-ventricular conduction delay  Possible Anterior infarct , age undetermined  T wave abnormality, consider inferior ischemia  Confirmed by IRAJ HEADLEY MD ([id number]) on 2/17/2023 10:27:53 AM  - TroponinI hsT: <-4921.64, <-7107.08, <-1356.86, <-32.06  < end of copied text >    < from: TTE Echo Complete w/o Contrast w/ Doppler (02.17.23 @ 16:40) >     Fibrocalcific changes noted to the mitral valve leaflets with preserved   leaflet excursion.   Trace mitral regurgitation is present.   Fibrocalcific changes noted to the Aortic valve leaflets with preserved   leaflet excursion.   Mild concentric left ventricular hypertrophy is present.   Estimated left ventricular ejection fraction is 60-65%.   Minimally dilated left ventricle.   Theleft atrium is moderately dilated.   Normal appearing right ventricle structure and function.      < end of copied text >

## 2023-02-18 NOTE — PROVIDER CONTACT NOTE (OTHER) - ASSESSMENT
Elevated BP, 8/10 chest pain
pt troponin went to 32 -->1356 -->7101   pt not currently complaining of chest pain or and pain at all.

## 2023-02-18 NOTE — PROVIDER CONTACT NOTE (OTHER) - SITUATION
pt admitted for chest pain currently on heparin gtt. to be transferred to [facility] for cardiac cath
Pt c/o 8/10 chest pain

## 2023-02-18 NOTE — PROVIDER CONTACT NOTE (OTHER) - RECOMMENDATIONS
SUBJECTIVE:    Patient ID: Arun Bonds is a 15 y.o. female. Arun Bonds is here today for Foot Injury (Pt states that turned on it and heard a pop and it has been hurtting ever since)  . HPI:   HPI       Left foot pain  Immediate pain with turn of foot yesterday am  Since injury pt has had pain to left lateral aspect and pain with twisting inward  tx-ice and ibuprofen  Pt is continuing to practice volleyball  Mom reports that pt is walking better          No past medical history on file. Prior to Visit Medications    Medication Sig Taking? Authorizing Provider   levocetirizine (XYZAL ALLERGY 24HR) 5 MG tablet Take 10 mg by mouth nightly Yes Historical Provider, MD   Multiple Vitamins-Minerals (MULTIVITAMIN PO) Take by mouth daily  Historical Provider, MD     No Known Allergies  Past Surgical History:   Procedure Laterality Date    LYMPH NODE DISSECTION      TONSILLECTOMY AND ADENOIDECTOMY       No family history on file. Social History     Social History    Marital status: Single     Spouse name: N/A    Number of children: N/A    Years of education: N/A     Occupational History    Not on file. Social History Main Topics    Smoking status: Never Smoker    Smokeless tobacco: Never Used    Alcohol use No    Drug use: No    Sexual activity: No     Other Topics Concern    Not on file     Social History Narrative    No narrative on file       Review of Systems   Constitutional: Negative for unexpected weight change. Musculoskeletal: Positive for arthralgias. OBJECTIVE:    Physical Exam   Constitutional: She is oriented to person, place, and time. She appears well-developed and well-nourished. HENT:   Head: Normocephalic and atraumatic. Eyes: Conjunctivae and EOM are normal. Pupils are equal, round, and reactive to light. Neck: Normal range of motion. Neck supple. No tracheal deviation present. Cardiovascular: Normal rate, regular rhythm and normal heart sounds.
Pulmonary/Chest: Effort normal and breath sounds normal.   Musculoskeletal:        Left foot: There is tenderness. There is normal range of motion. Feet:    Neurological: She is alert and oriented to person, place, and time. Skin: Skin is warm and dry. Psychiatric: She has a normal mood and affect. Her speech is normal and behavior is normal. Judgment and thought content normal. Her mood appears not anxious. Her affect is not angry. Cognition and memory are normal. She does not exhibit a depressed mood. Nursing note and vitals reviewed. /60 (Site: Right Arm, Position: Sitting, Cuff Size: Medium Adult)   Pulse 56   Temp 98 °F (36.7 °C) (Oral)   Ht 5' 4.5\" (1.638 m)   Wt 151 lb (68.5 kg)   LMP  (Approximate)   SpO2 99%   Breastfeeding? No   BMI 25.52 kg/m²      ASSESSMENT:      ICD-10-CM ICD-9-CM    1. Left foot pain M79.672 729.5 XR FOOT LEFT (MIN 3 VIEWS)       PLAN:    [facility]: Foot Injury (Pt states that turned on it and heard a pop and it has been hurtting ever since)  continue ice/nsaid prn  Wear support shoes comfortable to foot  Rest foot when able/ice after practice  F/u prn if sx are not resolving. Diagnosis and orders for this visit are above. Please note that this chart was generated using dragon dictation software. Although every effort was made to ensure the accuracy of this automated transcription, some errors in transcription may have occurred.
should pt be transferred to CICU or is MD okay with having pt stay on tele
Nitro sublingual

## 2023-02-18 NOTE — PROVIDER CONTACT NOTE (OTHER) - ACTION/TREATMENT ORDERED:
3x sublingual nitro ordered, nitro paste ordered, and EKG ordered. Lisinopril given early for HTN per MD Palla.
MD aware and ok with staying in tele if patient develops chest pain will reevaluate

## 2023-02-18 NOTE — PROGRESS NOTE ADULT - PROBLEM SELECTOR PLAN 1
Recurrent chest pain , elevated troponin , with elevated blood pressure ,  called his cardiologist yesterday evening , made arrangement to transfer , patient was not transferred , developed chest pain this morning    nitro was given ,again called  [facility] , spoke to dr luna and nursing supervisor Keyanna    will get him transferred , continue IV heparin , plavix , nitro patch , EKg ordered     will give statin , ecotrin , start on heparin INR <1.51 , , NO BB due to asthma and myasthenia. Recurrent chest pain , elevated troponin , with elevated blood pressure ,  called his cardiologist yesterday evening , made arrangement to transfer , patient was not transferred , developed chest pain this morning    nitro was given ,again called  [facility] , spoke to dr luna and nursing supervisor Keyanna    will get him transferred for cardiac cath  , continue IV heparin , plavix , nitro patch , EKg ordered     will give statin , ecotrin , start on heparin INR <1.51 , , NO BB due to asthma and myasthenia.

## 2023-02-18 NOTE — DISCHARGE NOTE NURSING/CASE MANAGEMENT/SOCIAL WORK - PATIENT PORTAL LINK FT
You can access the FollowMyHealth Patient Portal offered by [facility] by registering at the following website: [email or web address]. By joining [facility]’s FollowMyHealth portal, you will also be able to view your health information using other applications (apps) compatible with our system.

## 2023-02-18 NOTE — DISCHARGE NOTE NURSING/CASE MANAGEMENT/SOCIAL WORK - NSDCPEFALRISK_GEN_ALL_CORE
For information on Fall & Injury Prevention, visit: [email or web address] OR  [email or web address] OR  https://www.cdc.gov/steadi/patient.html

## 2023-02-22 DIAGNOSIS — G47.33 OBSTRUCTIVE SLEEP APNEA (ADULT) (PEDIATRIC): ICD-10-CM

## 2023-02-22 DIAGNOSIS — E66.9 OBESITY, UNSPECIFIED: ICD-10-CM

## 2023-02-22 DIAGNOSIS — I48.20 CHRONIC ATRIAL FIBRILLATION, UNSPECIFIED: ICD-10-CM

## 2023-02-22 DIAGNOSIS — I48.21 PERMANENT ATRIAL FIBRILLATION: ICD-10-CM

## 2023-02-22 DIAGNOSIS — I10 ESSENTIAL (PRIMARY) HYPERTENSION: ICD-10-CM

## 2023-02-22 DIAGNOSIS — Z88.1 ALLERGY STATUS TO OTHER ANTIBIOTIC AGENTS STATUS: ICD-10-CM
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
## 2023-02-22 DIAGNOSIS — Z88.2 ALLERGY STATUS TO SULFONAMIDES: ICD-10-CM
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
## 2023-02-22 DIAGNOSIS — U07.1 COVID-19: ICD-10-CM

## 2023-02-22 DIAGNOSIS — I21.4 NON-ST ELEVATION (NSTEMI) MYOCARDIAL INFARCTION: ICD-10-CM

## 2023-02-22 DIAGNOSIS — J45.909 UNSPECIFIED ASTHMA, UNCOMPLICATED: ICD-10-CM

## 2023-02-22 DIAGNOSIS — I25.10 ATHEROSCLEROTIC HEART DISEASE OF NATIVE CORONARY ARTERY WITHOUT ANGINA PECTORIS: ICD-10-CM

## 2023-02-22 DIAGNOSIS — G70.00 MYASTHENIA GRAVIS WITHOUT (ACUTE) EXACERBATION: ICD-10-CM

## 2023-02-22 DIAGNOSIS — S81.802A UNSPECIFIED OPEN WOUND, LEFT LOWER LEG, INITIAL ENCOUNTER: ICD-10-CM

## 2023-03-20 NOTE — PROGRESS NOTE ADULT - PROBLEM SELECTOR PROBLEM 2
BMI was above normal measurement. Current weight: 128 kg (282 lb)  Weight change since last visit (-) denotes wt loss -4 lbs   Weight loss needed to achieve BMI 25: 98.1 Lbs  Weight loss needed to achieve BMI 30: 61.3 Lbs  Advised to Increase physical activity.   HTN (hypertension)

## 2023-10-22 NOTE — ED PROVIDER NOTE - NS ED ROS FT
Constitutional: No fever or chills  Eyes: No visual changes  HEENT: No throat pain  CV: +CP  Resp: No SOB no cough  GI: No abd pain, nausea or vomiting  : No dysuria  MSK: No musculoskeletal pain  Skin: No rash  Neuro: No headache
PAST MEDICAL HISTORY:  No pertinent past medical history

## 2024-01-18 NOTE — ED STATDOCS - MUSCULOSKELETAL NEGATIVE STATEMENT, MLM
Order was written/H&P was completed/Contractions pattern was reviewed/FHR was reviewed/Induction / Augmentation was discussed
Left leg swelling

## 2024-04-05 ENCOUNTER — OUTPATIENT (OUTPATIENT)
Dept: OUTPATIENT SERVICES | Facility: HOSPITAL | Age: 64
LOS: 1 days | End: 2024-04-05
Payer: COMMERCIAL

## 2024-04-05 DIAGNOSIS — L97.222 NON-PRESSURE CHRONIC ULCER OF LEFT CALF WITH FAT LAYER EXPOSED: ICD-10-CM

## 2024-04-05 DIAGNOSIS — L97.829 NON-PRESSURE CHRONIC ULCER OF OTHER PART OF LEFT LOWER LEG WITH UNSPECIFIED SEVERITY: ICD-10-CM

## 2024-04-05 PROCEDURE — 99202 OFFICE O/P NEW SF 15 MIN: CPT | Mod: 25

## 2024-04-05 PROCEDURE — 93923 UPR/LXTR ART STDY 3+ LVLS: CPT

## 2024-04-05 PROCEDURE — 93923 UPR/LXTR ART STDY 3+ LVLS: CPT | Mod: 26

## 2024-04-05 PROCEDURE — 11042 DBRDMT SUBQ TIS 1ST 20SQCM/<: CPT

## 2024-04-12 ENCOUNTER — OUTPATIENT (OUTPATIENT)
Dept: OUTPATIENT SERVICES | Facility: HOSPITAL | Age: 64
LOS: 1 days | End: 2024-04-12
Payer: COMMERCIAL

## 2024-04-12 DIAGNOSIS — L97.822 NON-PRESSURE CHRONIC ULCER OF OTHER PART OF LEFT LOWER LEG WITH FAT LAYER EXPOSED: ICD-10-CM

## 2024-04-12 DIAGNOSIS — M19.90 UNSPECIFIED OSTEOARTHRITIS, UNSPECIFIED SITE: ICD-10-CM

## 2024-04-12 DIAGNOSIS — I89.0 LYMPHEDEMA, NOT ELSEWHERE CLASSIFIED: ICD-10-CM

## 2024-04-12 DIAGNOSIS — L97.829 NON-PRESSURE CHRONIC ULCER OF OTHER PART OF LEFT LOWER LEG WITH UNSPECIFIED SEVERITY: ICD-10-CM

## 2024-04-12 DIAGNOSIS — J45.998 OTHER ASTHMA: ICD-10-CM

## 2024-04-12 DIAGNOSIS — I87.312 CHRONIC VENOUS HYPERTENSION (IDIOPATHIC) WITH ULCER OF LEFT LOWER EXTREMITY: ICD-10-CM

## 2024-04-12 DIAGNOSIS — L03.116 CELLULITIS OF LEFT LOWER LIMB: ICD-10-CM

## 2024-04-12 DIAGNOSIS — I10 ESSENTIAL (PRIMARY) HYPERTENSION: ICD-10-CM

## 2024-04-12 PROCEDURE — 11042 DBRDMT SUBQ TIS 1ST 20SQCM/<: CPT

## 2024-04-19 ENCOUNTER — OUTPATIENT (OUTPATIENT)
Dept: OUTPATIENT SERVICES | Facility: HOSPITAL | Age: 64
LOS: 1 days | End: 2024-04-19
Payer: COMMERCIAL

## 2024-04-19 DIAGNOSIS — I10 ESSENTIAL (PRIMARY) HYPERTENSION: ICD-10-CM

## 2024-04-19 DIAGNOSIS — I89.0 LYMPHEDEMA, NOT ELSEWHERE CLASSIFIED: ICD-10-CM

## 2024-04-19 DIAGNOSIS — I87.312 CHRONIC VENOUS HYPERTENSION (IDIOPATHIC) WITH ULCER OF LEFT LOWER EXTREMITY: ICD-10-CM

## 2024-04-19 DIAGNOSIS — L03.116 CELLULITIS OF LEFT LOWER LIMB: ICD-10-CM

## 2024-04-19 DIAGNOSIS — L97.822 NON-PRESSURE CHRONIC ULCER OF OTHER PART OF LEFT LOWER LEG WITH FAT LAYER EXPOSED: ICD-10-CM

## 2024-04-19 DIAGNOSIS — M19.90 UNSPECIFIED OSTEOARTHRITIS, UNSPECIFIED SITE: ICD-10-CM

## 2024-04-19 DIAGNOSIS — J45.998 OTHER ASTHMA: ICD-10-CM

## 2024-04-19 DIAGNOSIS — L97.829 NON-PRESSURE CHRONIC ULCER OF OTHER PART OF LEFT LOWER LEG WITH UNSPECIFIED SEVERITY: ICD-10-CM

## 2024-04-19 PROCEDURE — 11042 DBRDMT SUBQ TIS 1ST 20SQCM/<: CPT

## 2024-04-26 ENCOUNTER — OUTPATIENT (OUTPATIENT)
Dept: OUTPATIENT SERVICES | Facility: HOSPITAL | Age: 64
LOS: 1 days | End: 2024-04-26
Payer: COMMERCIAL

## 2024-04-26 DIAGNOSIS — I10 ESSENTIAL (PRIMARY) HYPERTENSION: ICD-10-CM

## 2024-04-26 DIAGNOSIS — L03.116 CELLULITIS OF LEFT LOWER LIMB: ICD-10-CM

## 2024-04-26 DIAGNOSIS — L97.829 NON-PRESSURE CHRONIC ULCER OF OTHER PART OF LEFT LOWER LEG WITH UNSPECIFIED SEVERITY: ICD-10-CM

## 2024-04-26 DIAGNOSIS — L97.822 NON-PRESSURE CHRONIC ULCER OF OTHER PART OF LEFT LOWER LEG WITH FAT LAYER EXPOSED: ICD-10-CM

## 2024-04-26 DIAGNOSIS — I89.0 LYMPHEDEMA, NOT ELSEWHERE CLASSIFIED: ICD-10-CM

## 2024-04-26 DIAGNOSIS — M19.90 UNSPECIFIED OSTEOARTHRITIS, UNSPECIFIED SITE: ICD-10-CM

## 2024-04-26 DIAGNOSIS — J45.998 OTHER ASTHMA: ICD-10-CM

## 2024-04-26 DIAGNOSIS — I87.312 CHRONIC VENOUS HYPERTENSION (IDIOPATHIC) WITH ULCER OF LEFT LOWER EXTREMITY: ICD-10-CM

## 2024-04-26 PROCEDURE — 11042 DBRDMT SUBQ TIS 1ST 20SQCM/<: CPT

## 2024-05-03 ENCOUNTER — OUTPATIENT (OUTPATIENT)
Dept: OUTPATIENT SERVICES | Facility: HOSPITAL | Age: 64
LOS: 1 days | End: 2024-05-03
Payer: COMMERCIAL

## 2024-05-03 DIAGNOSIS — L97.829 NON-PRESSURE CHRONIC ULCER OF OTHER PART OF LEFT LOWER LEG WITH UNSPECIFIED SEVERITY: ICD-10-CM

## 2024-05-03 PROCEDURE — 11042 DBRDMT SUBQ TIS 1ST 20SQCM/<: CPT

## 2024-05-06 ENCOUNTER — EMERGENCY (EMERGENCY)
Facility: HOSPITAL | Age: 64
LOS: 0 days | Discharge: HOME | End: 2024-05-06
Attending: EMERGENCY MEDICINE
Payer: COMMERCIAL

## 2024-05-06 VITALS
HEART RATE: 86 BPM | TEMPERATURE: 99 F | DIASTOLIC BLOOD PRESSURE: 58 MMHG | RESPIRATION RATE: 18 BRPM | SYSTOLIC BLOOD PRESSURE: 144 MMHG | OXYGEN SATURATION: 96 %

## 2024-05-06 VITALS — WEIGHT: 300.05 LBS

## 2024-05-06 DIAGNOSIS — Z87.2 PERSONAL HISTORY OF DISEASES OF THE SKIN AND SUBCUTANEOUS TISSUE: ICD-10-CM

## 2024-05-06 DIAGNOSIS — Z88.1 ALLERGY STATUS TO OTHER ANTIBIOTIC AGENTS STATUS: ICD-10-CM

## 2024-05-06 DIAGNOSIS — L03.115 CELLULITIS OF RIGHT LOWER LIMB: ICD-10-CM

## 2024-05-06 DIAGNOSIS — M79.671 PAIN IN RIGHT FOOT: ICD-10-CM

## 2024-05-06 DIAGNOSIS — I48.91 UNSPECIFIED ATRIAL FIBRILLATION: ICD-10-CM

## 2024-05-06 DIAGNOSIS — L97.519 NON-PRESSURE CHRONIC ULCER OF OTHER PART OF RIGHT FOOT WITH UNSPECIFIED SEVERITY: ICD-10-CM

## 2024-05-06 DIAGNOSIS — Z79.01 LONG TERM (CURRENT) USE OF ANTICOAGULANTS: ICD-10-CM

## 2024-05-06 DIAGNOSIS — Z88.2 ALLERGY STATUS TO SULFONAMIDES: ICD-10-CM

## 2024-05-06 DIAGNOSIS — G70.00 MYASTHENIA GRAVIS WITHOUT (ACUTE) EXACERBATION: ICD-10-CM

## 2024-05-06 DIAGNOSIS — Z88.0 ALLERGY STATUS TO PENICILLIN: ICD-10-CM

## 2024-05-06 LAB
ALBUMIN SERPL ELPH-MCNC: 3.7 G/DL — SIGNIFICANT CHANGE UP (ref 3.3–5)
ALP SERPL-CCNC: 41 U/L — SIGNIFICANT CHANGE UP (ref 40–120)
ALT FLD-CCNC: 17 U/L — SIGNIFICANT CHANGE UP (ref 12–78)
ANION GAP SERPL CALC-SCNC: 4 MMOL/L — LOW (ref 5–17)
APTT BLD: 33.5 SEC — SIGNIFICANT CHANGE UP (ref 24.5–35.6)
AST SERPL-CCNC: 18 U/L — SIGNIFICANT CHANGE UP (ref 15–37)
BASOPHILS # BLD AUTO: 0.09 K/UL — SIGNIFICANT CHANGE UP (ref 0–0.2)
BASOPHILS NFR BLD AUTO: 0.9 % — SIGNIFICANT CHANGE UP (ref 0–2)
BILIRUB SERPL-MCNC: 2.4 MG/DL — HIGH (ref 0.2–1.2)
BUN SERPL-MCNC: 15 MG/DL — SIGNIFICANT CHANGE UP (ref 7–23)
CALCIUM SERPL-MCNC: 8.9 MG/DL — SIGNIFICANT CHANGE UP (ref 8.5–10.1)
CHLORIDE SERPL-SCNC: 106 MMOL/L — SIGNIFICANT CHANGE UP (ref 96–108)
CO2 SERPL-SCNC: 25 MMOL/L — SIGNIFICANT CHANGE UP (ref 22–31)
CREAT SERPL-MCNC: 0.95 MG/DL — SIGNIFICANT CHANGE UP (ref 0.5–1.3)
EGFR: 90 ML/MIN/1.73M2 — SIGNIFICANT CHANGE UP
EOSINOPHIL # BLD AUTO: 0.07 K/UL — SIGNIFICANT CHANGE UP (ref 0–0.5)
EOSINOPHIL NFR BLD AUTO: 0.7 % — SIGNIFICANT CHANGE UP (ref 0–6)
GLUCOSE SERPL-MCNC: 95 MG/DL — SIGNIFICANT CHANGE UP (ref 70–99)
HCT VFR BLD CALC: 43.7 % — SIGNIFICANT CHANGE UP (ref 39–50)
HGB BLD-MCNC: 13.6 G/DL — SIGNIFICANT CHANGE UP (ref 13–17)
IMM GRANULOCYTES NFR BLD AUTO: 0.8 % — SIGNIFICANT CHANGE UP (ref 0–0.9)
INR BLD: 1.26 RATIO — HIGH (ref 0.85–1.18)
LACTATE SERPL-SCNC: 1.3 MMOL/L — SIGNIFICANT CHANGE UP (ref 0.7–2)
LYMPHOCYTES # BLD AUTO: 0.83 K/UL — LOW (ref 1–3.3)
LYMPHOCYTES # BLD AUTO: 7.9 % — LOW (ref 13–44)
MCHC RBC-ENTMCNC: 29.3 PG — SIGNIFICANT CHANGE UP (ref 27–34)
MCHC RBC-ENTMCNC: 31.1 GM/DL — LOW (ref 32–36)
MCV RBC AUTO: 94.2 FL — SIGNIFICANT CHANGE UP (ref 80–100)
MONOCYTES # BLD AUTO: 1.07 K/UL — HIGH (ref 0–0.9)
MONOCYTES NFR BLD AUTO: 10.1 % — SIGNIFICANT CHANGE UP (ref 2–14)
NEUTROPHILS # BLD AUTO: 8.42 K/UL — HIGH (ref 1.8–7.4)
NEUTROPHILS NFR BLD AUTO: 79.6 % — HIGH (ref 43–77)
PLATELET # BLD AUTO: 275 K/UL — SIGNIFICANT CHANGE UP (ref 150–400)
POTASSIUM SERPL-MCNC: 4.2 MMOL/L — SIGNIFICANT CHANGE UP (ref 3.5–5.3)
POTASSIUM SERPL-SCNC: 4.2 MMOL/L — SIGNIFICANT CHANGE UP (ref 3.5–5.3)
PROT SERPL-MCNC: 7 GM/DL — SIGNIFICANT CHANGE UP (ref 6–8.3)
PROTHROM AB SERPL-ACNC: 14.1 SEC — HIGH (ref 9.5–13)
RBC # BLD: 4.64 M/UL — SIGNIFICANT CHANGE UP (ref 4.2–5.8)
RBC # FLD: 13.5 % — SIGNIFICANT CHANGE UP (ref 10.3–14.5)
SODIUM SERPL-SCNC: 135 MMOL/L — SIGNIFICANT CHANGE UP (ref 135–145)
TROPONIN I, HIGH SENSITIVITY RESULT: 26.87 NG/L — SIGNIFICANT CHANGE UP
WBC # BLD: 10.56 K/UL — HIGH (ref 3.8–10.5)
WBC # FLD AUTO: 10.56 K/UL — HIGH (ref 3.8–10.5)

## 2024-05-06 PROCEDURE — 87040 BLOOD CULTURE FOR BACTERIA: CPT

## 2024-05-06 PROCEDURE — 85730 THROMBOPLASTIN TIME PARTIAL: CPT

## 2024-05-06 PROCEDURE — 85025 COMPLETE CBC W/AUTO DIFF WBC: CPT

## 2024-05-06 PROCEDURE — 83605 ASSAY OF LACTIC ACID: CPT

## 2024-05-06 PROCEDURE — 80053 COMPREHEN METABOLIC PANEL: CPT

## 2024-05-06 PROCEDURE — 93971 EXTREMITY STUDY: CPT | Mod: 26,RT

## 2024-05-06 PROCEDURE — 99285 EMERGENCY DEPT VISIT HI MDM: CPT

## 2024-05-06 PROCEDURE — 73701 CT LOWER EXTREMITY W/DYE: CPT | Mod: 26,RT,MC

## 2024-05-06 PROCEDURE — 73701 CT LOWER EXTREMITY W/DYE: CPT | Mod: MC,RT

## 2024-05-06 PROCEDURE — 93971 EXTREMITY STUDY: CPT | Mod: RT

## 2024-05-06 PROCEDURE — 99285 EMERGENCY DEPT VISIT HI MDM: CPT | Mod: 25

## 2024-05-06 PROCEDURE — 73630 X-RAY EXAM OF FOOT: CPT | Mod: 26,RT

## 2024-05-06 PROCEDURE — 73630 X-RAY EXAM OF FOOT: CPT | Mod: RT

## 2024-05-06 PROCEDURE — 84484 ASSAY OF TROPONIN QUANT: CPT

## 2024-05-06 PROCEDURE — 36415 COLL VENOUS BLD VENIPUNCTURE: CPT

## 2024-05-06 PROCEDURE — 96375 TX/PRO/DX INJ NEW DRUG ADDON: CPT

## 2024-05-06 PROCEDURE — 96374 THER/PROPH/DIAG INJ IV PUSH: CPT | Mod: XU

## 2024-05-06 PROCEDURE — 85610 PROTHROMBIN TIME: CPT

## 2024-05-06 RX ORDER — ACETAMINOPHEN 500 MG
1000 TABLET ORAL ONCE
Refills: 0 | Status: COMPLETED | OUTPATIENT
Start: 2024-05-06 | End: 2024-05-06

## 2024-05-06 RX ORDER — AMPICILLIN SODIUM AND SULBACTAM SODIUM 250; 125 MG/ML; MG/ML
INJECTION, POWDER, FOR SUSPENSION INTRAMUSCULAR; INTRAVENOUS
Refills: 0 | Status: DISCONTINUED | OUTPATIENT
Start: 2024-05-06 | End: 2024-05-06

## 2024-05-06 RX ORDER — OXYCODONE HYDROCHLORIDE 5 MG/1
1 TABLET ORAL
Qty: 8 | Refills: 0
Start: 2024-05-06

## 2024-05-06 RX ORDER — AMPICILLIN SODIUM AND SULBACTAM SODIUM 250; 125 MG/ML; MG/ML
3 INJECTION, POWDER, FOR SUSPENSION INTRAMUSCULAR; INTRAVENOUS ONCE
Refills: 0 | Status: COMPLETED | OUTPATIENT
Start: 2024-05-06 | End: 2024-05-06

## 2024-05-06 RX ORDER — AMPICILLIN SODIUM AND SULBACTAM SODIUM 250; 125 MG/ML; MG/ML
3 INJECTION, POWDER, FOR SUSPENSION INTRAMUSCULAR; INTRAVENOUS EVERY 6 HOURS
Refills: 0 | Status: DISCONTINUED | OUTPATIENT
Start: 2024-05-06 | End: 2024-05-06

## 2024-05-06 RX ORDER — MORPHINE SULFATE 50 MG/1
4 CAPSULE, EXTENDED RELEASE ORAL ONCE
Refills: 0 | Status: DISCONTINUED | OUTPATIENT
Start: 2024-05-06 | End: 2024-05-06

## 2024-05-06 RX ADMIN — AMPICILLIN SODIUM AND SULBACTAM SODIUM 200 GRAM(S): 250; 125 INJECTION, POWDER, FOR SUSPENSION INTRAMUSCULAR; INTRAVENOUS at 12:15

## 2024-05-06 RX ADMIN — Medication 400 MILLIGRAM(S): at 12:49

## 2024-05-06 NOTE — ED STATDOCS - CARE PROVIDER_API CALL
Solomon Castanon  Foot and Ankle Surgery  [address], [address]  [place] [address]  Phone: [phone]  Fax: [phone]  Follow Up Time:

## 2024-05-06 NOTE — ED ADULT NURSE NOTE - OBJECTIVE STATEMENT
Right foot infection open wound was send by Dr Landa FOR IV ABX. B/L LEG SWELLING, skin red and flashed. Left arm swelling pt states it happens often and resolved on its own. .

## 2024-05-06 NOTE — ED STATDOCS - CARE PLAN
Principal Discharge DX:	Cellulitis of right lower extremity  Secondary Diagnosis:	Skin ulcer of foot   1

## 2024-05-06 NOTE — CONSULT NOTE ADULT - SUBJECTIVE AND OBJECTIVE BOX
Date of consult: 5/6/24  CHIEF COMPLAINT: pt presents to ED due to complaints of right foot pain with redness and pain pt states hes has hx of cellulitis in her other foot, pt states sx started a few weeks ago but now his whole foot is erythema, edema  foot pain/injury, foot infection      PMH: No pertinent past medical history    Afib      PSH:No significant past surgical history        Allergies:sulfamethoxazole-trimethoprim (Flushing; Urticaria; Rash)  vancomycin (Unknown)  Keflex (Flushing; Urticaria; Rash)      Labs:                          13.6   10.56 )-----------( 275      ( 06 May 2024 11:41 )             43.7     WBC Trend  10.56<H> Date (05-06 @ 11:41)      Chem  05-06    135  |  106  |  15  ----------------------------<  95  4.2   |  25  |  0.95    Ca    8.9      06 May 2024 11:41    TPro  7.0  /  Alb  3.7  /  TBili  2.4<H>  /  DBili  x   /  AST  18  /  ALT  17  /  AlkPhos  41  05-06      MEDICATIONS  (STANDING):  acetaminophen   IVPB .. 1000 milliGRAM(s) IV Intermittent Once  ampicillin/sulbactam  IVPB      ampicillin/sulbactam  IVPB 3 Gram(s) IV Intermittent every 6 hours    MEDICATIONS  (PRN):      Vitals  T(F): 99.8 (05-06-24 @ 11:13), Max: 99.8 (05-06-24 @ 11:13)  HR: 87 (05-06-24 @ 11:13) (87 - 87)  BP: 162/88 (05-06-24 @ 11:13) (162/88 - 162/88)  RR: 20 (05-06-24 @ 11:13) (20 - 20)  SpO2: 97% (05-06-24 @ 11:13) (97% - 97%)  Wt(kg): --    REVIEW OF SYSTEMS:    CONSTITUTIONAL: No weakness, fevers or chills  EYES: No visual changes  RESPIRATORY: No cough, wheezing; No shortness of breath  CARDIOVASCULAR: No chest pain or palpitations  GASTROINTESTINAL: No abdominal or epigastric pain. No nausea, vomiting; No diarrhea or constipation.   GENITOURINARY: No dysuria, frequency or hematuria  NEUROLOGICAL: No numbness or weakness  SKIN: See physical examination.  All other review of systems is negative unless indicated above    Physical Exam:   Constitutional: NAD, alert;  Lower Extremity Focus  Derm:  Skin warm, dry and supple bilateral.    Bilateral lower extremity pitting edema worse on the right, blanching erythema to right lower leg, discoloration of right foot, 2 ulcerations at the base of toes with drainage, tenderness to palpation of right second third and fourth toes  Vascular: Dorsalis Pedis and Posterior Tibial pulses 2/4.  Capillary re-fill time less then 3 seconds digits 1-5 bilateral.    Neuro: Protective sensation intact to the level of the digits bilateral.  MSK: Muscle strength 5/5 all major muscle groups bilateral.    Rad  < from: [facility] Physiol Extremity Lower 3+ Level, BI (04.05.24 @ 14:01) >  Impression:  Right lower extremity: The ankle brachial index is 1.23. TBI is 0.90 with   digital pressures of 147 mmHg. The pulse waveforms are normal to near   normal.    Left lower extremity: The anklebrachial index is unobtainable. TBI is   0.93 with digital pressures of 151 mmHg. The pulse waveforms are normal   to near normal.    Diffusely calcified noncompressible vessels limiting evaluation.    < end of copied text >      Date of consult: 5/6/24  CHIEF COMPLAINT: pt presents to ED due to complaints of right foot pain with redness and pain pt states hes has hx of cellulitis in her other foot, pt states sx started a few weeks ago but now his whole foot is erythema, edema foot pain/injury, foot infection      PMH: No pertinent past medical history    Afib      PSH:No significant past surgical history        Allergies:sulfamethoxazole-trimethoprim (Flushing; Urticaria; Rash)  vancomycin (Unknown)  Keflex (Flushing; Urticaria; Rash)      Labs:                          13.6   10.56 )-----------( 275      ( 06 May 2024 11:41 )             43.7     WBC Trend  10.56<H> Date (05-06 @ 11:41)      Chem  05-06    135  |  106  |  15  ----------------------------<  95  4.2   |  25  |  0.95    Ca    8.9      06 May 2024 11:41    TPro  7.0  /  Alb  3.7  /  TBili  2.4<H>  /  DBili  x   /  AST  18  /  ALT  17  /  AlkPhos  41  05-06      MEDICATIONS  (STANDING):  acetaminophen   IVPB .. 1000 milliGRAM(s) IV Intermittent Once  ampicillin/sulbactam  IVPB      ampicillin/sulbactam  IVPB 3 Gram(s) IV Intermittent every 6 hours    MEDICATIONS  (PRN):      Vitals  T(F): 99.8 (05-06-24 @ 11:13), Max: 99.8 (05-06-24 @ 11:13)  HR: 87 (05-06-24 @ 11:13) (87 - 87)  BP: 162/88 (05-06-24 @ 11:13) (162/88 - 162/88)  RR: 20 (05-06-24 @ 11:13) (20 - 20)  SpO2: 97% (05-06-24 @ 11:13) (97% - 97%)  Wt(kg): --    REVIEW OF SYSTEMS:    CONSTITUTIONAL: No weakness, fevers or chills  EYES: No visual changes  RESPIRATORY: No cough, wheezing; No shortness of breath  CARDIOVASCULAR: No chest pain or palpitations  GASTROINTESTINAL: No abdominal or epigastric pain. No nausea, vomiting; No diarrhea or constipation.   GENITOURINARY: No dysuria, frequency or hematuria  NEUROLOGICAL: No numbness or weakness  SKIN: See physical examination.  All other review of systems is negative unless indicated above    Physical Exam:   Constitutional: NAD, alert;  Lower Extremity Focus  Derm:  Skin warm, dry and supple bilateral.    Bilateral lower extremity pitting edema worse on the right, blanching erythema to right lower leg, discoloration of right foot, 2 ulcerations at the base of toes with drainage, tenderness to palpation of right second third and fourth toes  Vascular: Dorsalis Pedis and Posterior Tibial pulses 2/4.  Capillary re-fill time less then 3 seconds digits 1-5 bilateral.    Neuro: Protective sensation intact to the level of the digits bilateral.  MSK: Muscle strength 5/5 all major muscle groups bilateral.    Rad  < from: [facility] Physiol Extremity Lower 3+ Level, BI (04.05.24 @ 14:01) >  Impression:  Right lower extremity: The ankle brachial index is 1.23. TBI is 0.90 with   digital pressures of 147 mmHg. The pulse waveforms are normal to near   normal.    Left lower extremity: The anklebrachial index is unobtainable. TBI is   0.93 with digital pressures of 151 mmHg. The pulse waveforms are normal   to near normal.    Diffusely calcified noncompressible vessels limiting evaluation.    < end of copied text >

## 2024-05-06 NOTE — ED STATDOCS - PATIENT PORTAL LINK FT
You can access the FollowMyHealth Patient Portal offered by [facility] by registering at the following website: [email or web address]. By joining [employer]’s FollowMyHealth portal, you will also be able to view your health information using other applications (apps) compatible with our system.

## 2024-05-06 NOTE — ED STATDOCS - NSFOLLOWUPINSTRUCTIONS_ED_ALL_ED_FT
Follow up with Dr. Castanon on Friday.   Take antibiotics as prescribed, take dose tonight.  Take Tylenol 650-1000 mg every 6 hours as needed for pain. Do not exceed 4,000 mg in a 24 hour period.  Take oxycodone for breakthrough pain as needed.  Return to the ED for new or worsening symptoms including fever, chills, worsening discharge from wound, red streaking up leg.    Cellulitis    WHAT YOU NEED TO KNOW:    Cellulitis is a skin infection caused by bacteria. Cellulitis may go away on its own or you may need treatment. Your healthcare provider may draw a [place] around the outside edges of your cellulitis. If your cellulitis spreads, your healthcare provider will see it outside of the [place]. Cellulitis          DISCHARGE INSTRUCTIONS:    Call 911 if:     You have sudden trouble breathing or chest pain.        Return to the emergency department if:     Your wound gets larger and more painful.       You feel a crackling under your skin when you touch it.      You have purple dots or bumps on your skin, or you see bleeding under your skin.      You have new swelling and pain in your legs.      The red, warm, swollen area gets larger.      You see red streaks coming from the infected area.    Contact your healthcare provider if:     You have a fever.      Your fever or pain does not go away or gets worse.      The area does not get smaller after 2 days of antibiotics.      Your skin is flaking or peeling off.      You have questions or concerns about your condition or care.    Medicines:     Antibiotics help treat the bacterial infection.       NSAIDs, such as ibuprofen, help decrease swelling, pain, and fever. NSAIDs can cause stomach bleeding or kidney problems in certain people. If you take blood thinner medicine, always ask if NSAIDs are safe for you. Always read the medicine label and follow directions. Do not give these medicines to children under 6 months of age without direction from your child's healthcare provider.      Acetaminophen decreases pain and fever. It is available without a doctor's order. Ask how much to take and how often to take it. Follow directions. Read the labels of all other medicines you are using to see if they also contain acetaminophen, or ask your doctor or pharmacist. Acetaminophen can cause liver damage if not taken correctly. Do not use more than 4 grams (4,000 milligrams) total of acetaminophen in one day.       Take your medicine as directed. Contact your healthcare provider if you think your medicine is not helping or if you have side effects. Tell him or her if you are allergic to any medicine. Keep a list of the medicines, vitamins, and herbs you take. Include the amounts, and when and why you take them. Bring the list or the pill bottles to follow-up visits. Carry your medicine list with you in case of an emergency.    Self-care:     Elevate the area above the level of your heart as often as you can. This will help decrease swelling and pain. Prop the area on pillows or blankets to keep it elevated comfortably.       Clean the area daily until the wound scabs over. Gently wash the area with soap and water. Pat dry. Use dressings as directed.       Place cool or warm, wet cloths on the area as directed. Use clean cloths and clean water. Leave it on the area until the cloth is room temperature. Pat the area dry with a clean, dry cloth. The cloths may help decrease pain.     Prevent cellulitis:     Do not scratch bug bites or areas of injury. You increase your risk for cellulitis by scratching these areas.       Do not share personal items, such as towels, clothing, and razors.       Clean exercise equipment with germ-killing  before and after you use it.      Wash your hands often. Use soap and water. Wash your hands after you use the bathroom, change a child's diapers, or sneeze. Wash your hands before you prepare or eat food. Use lotion to prevent dry, cracked skin. Handwashing           Wear pressure stockings as directed. You may be told to wear the stockings if you have peripheral edema. The stockings improve blood flow and decrease swelling.      Treat athlete’s foot. This can help prevent the spread of a bacterial skin infection.    Follow up with your healthcare provider within 3 days, or as directed: Your healthcare provider will check if your cellulitis is getting better. You may need different medicine. Write down your questions so you remember to ask them during your visits.

## 2024-05-06 NOTE — ED STATDOCS - OBJECTIVE STATEMENT
62 y/o male with a PMHx of Afib on Coumadin, myasthenia gravis presents to the ED c/o right foot pain x3 to 4 days. No other complaints at this time.

## 2024-05-06 NOTE — CONSULT NOTE ADULT - ASSESSMENT
Assesment: 64 yo male seen for the following:   - Right foot pain  - Cellulitis to right lower extremity  - Partial thickness ulcerations to right foot    P:   Chart reviewed and Patient evaluated;  Discussed diagnosis and treatment with patient. Discussed importance of daily foot examinations, proper shoe gear, importance of tight glycemic control.   X-rays reviewed : on wet read showing no gas or fx  Ordered CT to R foot   Wound flush with normal saline  Wound cx taken  Applied --- with dry sterile dressing  WBAT  to ------------  Offload bilateral heels while bedbound  Continue antibiotics as per ID  All additional care per Med appreciated  Patient demonstrated verbal understanding of all interventions and tolerated interventions well without any complications.   Podiatry will follow while in house      Assesment: 62 yo male seen for the following:   - Right foot pain  - Cellulitis to right lower extremity  - Partial thickness ulcerations to right foot    P:   Chart reviewed and Patient evaluated;  Discussed diagnosis and treatment with patient. Discussed importance of daily foot examinations, proper shoe gear, importance of tight glycemic control.   X-rays reviewed : on wet read showing no gas or fx  CT right foot: no abscess no osteomyelitis   Offload bilateral heels while bedbound  All additional care per Med appreciated  Patient to be discharged w po abx   Patient demonstrated verbal understanding of all interventions and tolerated interventions well without any complications.   Patient to follow up w/ dr. stephens 1 week after discharge at the wound care center

## 2024-05-06 NOTE — ED STATDOCS - SKIN, MLM
Pitting edema b/l with increased erythema, worse on right. 2 shallow ulcers just below toes with weeping. Tenderness over 2nd, 3rd and 4th toes. NVI.

## 2024-05-06 NOTE — ED ADULT TRIAGE NOTE - CHIEF COMPLAINT QUOTE
pt presents to ED due to complaints of right foot pain with redness and pain pt states hes has hx of cellulitis in her other foot, pt states sx started a few weeks ago but now his whole foot is erythema, edema

## 2024-05-06 NOTE — ED STATDOCS - PROGRESS NOTE DETAILS
63-year-old male with past medical history of A-fib on Coumadin, myasthenia gravis on prednisone daily presents emergency department complaining of right foot pain for the past 3 to 4 days.  Patient has recurrent cellulitis, currently treated by the wound center for cellulitis of left leg.  Patient denies any trauma to the foot.  Vitals are stable.  Physical exam demonstrates bilateral lower extremity pitting edema worse on the right, blanching erythema to right lower leg, discoloration of right foot, 2 ulcerations at the base of toes with drainage, tenderness to palpation of right second third and fourth toes.  X-ray performed no free air or bony destruction.  Labs still pending.  Will give dose IV antibiotics.  Podiatry consulted, will evaluate patient.  Anticipate admission for IV antibiotics. - Lauren Olguin PA-C Labs reviewed, WBC 10.56, rest of lab unremarkable. Podiatry saw patient, recommended venous doppler which was performed and negative for DVT. Discussed result with resident, will touch base with attending to discuss dispo. - Lauren Olguin PA-C Podiatry ordered CT leg, no abscesses appreciated. Case discussed with podiatry attending Dr. Castanon. No Leukocytosis, no lactate, no fever/chills, so patient can be discharged with po trial of antibiotics and will f/u with Lg on Friday. Strict return precautions were given. All questions and concerns were addressed. - Lauren Olguin PA-C

## 2024-05-06 NOTE — ED STATDOCS - ATTENDING APP SHARED VISIT CONTRIBUTION OF CARE
I personally saw the patient with the DARWIN, and completed the key components of the history and physical exam. I then discussed the management plan with the DARWIN.

## 2024-05-10 ENCOUNTER — OUTPATIENT (OUTPATIENT)
Dept: OUTPATIENT SERVICES | Facility: HOSPITAL | Age: 64
LOS: 1 days | End: 2024-05-10
Payer: COMMERCIAL

## 2024-05-10 DIAGNOSIS — L97.829 NON-PRESSURE CHRONIC ULCER OF OTHER PART OF LEFT LOWER LEG WITH UNSPECIFIED SEVERITY: ICD-10-CM

## 2024-05-10 PROCEDURE — 11042 DBRDMT SUBQ TIS 1ST 20SQCM/<: CPT

## 2024-05-13 PROBLEM — G70.00 MYASTHENIA GRAVIS WITHOUT (ACUTE) EXACERBATION: Chronic | Status: ACTIVE | Noted: 2024-05-06

## 2024-05-15 DIAGNOSIS — J45.998 OTHER ASTHMA: ICD-10-CM

## 2024-05-15 DIAGNOSIS — I10 ESSENTIAL (PRIMARY) HYPERTENSION: ICD-10-CM

## 2024-05-15 DIAGNOSIS — I87.312 CHRONIC VENOUS HYPERTENSION (IDIOPATHIC) WITH ULCER OF LEFT LOWER EXTREMITY: ICD-10-CM

## 2024-05-15 DIAGNOSIS — I89.0 LYMPHEDEMA, NOT ELSEWHERE CLASSIFIED: ICD-10-CM

## 2024-05-15 DIAGNOSIS — L97.822 NON-PRESSURE CHRONIC ULCER OF OTHER PART OF LEFT LOWER LEG WITH FAT LAYER EXPOSED: ICD-10-CM

## 2024-05-15 DIAGNOSIS — M19.90 UNSPECIFIED OSTEOARTHRITIS, UNSPECIFIED SITE: ICD-10-CM

## 2024-05-15 DIAGNOSIS — L03.116 CELLULITIS OF LEFT LOWER LIMB: ICD-10-CM

## 2024-05-17 ENCOUNTER — OUTPATIENT (OUTPATIENT)
Dept: OUTPATIENT SERVICES | Facility: HOSPITAL | Age: 64
LOS: 1 days | End: 2024-05-17
Payer: COMMERCIAL

## 2024-05-17 DIAGNOSIS — I10 ESSENTIAL (PRIMARY) HYPERTENSION: ICD-10-CM

## 2024-05-17 DIAGNOSIS — I89.0 LYMPHEDEMA, NOT ELSEWHERE CLASSIFIED: ICD-10-CM

## 2024-05-17 DIAGNOSIS — L97.518 NON-PRESSURE CHRONIC ULCER OF OTHER PART OF RIGHT FOOT WITH OTHER SPECIFIED SEVERITY: ICD-10-CM

## 2024-05-17 DIAGNOSIS — I87.312 CHRONIC VENOUS HYPERTENSION (IDIOPATHIC) WITH ULCER OF LEFT LOWER EXTREMITY: ICD-10-CM

## 2024-05-17 DIAGNOSIS — L97.822 NON-PRESSURE CHRONIC ULCER OF OTHER PART OF LEFT LOWER LEG WITH FAT LAYER EXPOSED: ICD-10-CM

## 2024-05-17 DIAGNOSIS — M19.90 UNSPECIFIED OSTEOARTHRITIS, UNSPECIFIED SITE: ICD-10-CM

## 2024-05-17 DIAGNOSIS — J45.998 OTHER ASTHMA: ICD-10-CM

## 2024-05-17 DIAGNOSIS — L03.116 CELLULITIS OF LEFT LOWER LIMB: ICD-10-CM

## 2024-05-17 DIAGNOSIS — I73.9 PERIPHERAL VASCULAR DISEASE, UNSPECIFIED: ICD-10-CM

## 2024-05-17 DIAGNOSIS — L97.829 NON-PRESSURE CHRONIC ULCER OF OTHER PART OF LEFT LOWER LEG WITH UNSPECIFIED SEVERITY: ICD-10-CM

## 2024-05-17 DIAGNOSIS — L97.528 NON-PRESSURE CHRONIC ULCER OF OTHER PART OF LEFT FOOT WITH OTHER SPECIFIED SEVERITY: ICD-10-CM

## 2024-05-17 PROCEDURE — 93925 LOWER EXTREMITY STUDY: CPT

## 2024-05-17 PROCEDURE — 11042 DBRDMT SUBQ TIS 1ST 20SQCM/<: CPT

## 2024-05-17 PROCEDURE — 93925 LOWER EXTREMITY STUDY: CPT | Mod: 26

## 2024-05-21 DIAGNOSIS — L03.116 CELLULITIS OF LEFT LOWER LIMB: ICD-10-CM

## 2024-05-21 DIAGNOSIS — I89.0 LYMPHEDEMA, NOT ELSEWHERE CLASSIFIED: ICD-10-CM

## 2024-05-21 DIAGNOSIS — J45.998 OTHER ASTHMA: ICD-10-CM

## 2024-05-21 DIAGNOSIS — I10 ESSENTIAL (PRIMARY) HYPERTENSION: ICD-10-CM

## 2024-05-21 DIAGNOSIS — I87.312 CHRONIC VENOUS HYPERTENSION (IDIOPATHIC) WITH ULCER OF LEFT LOWER EXTREMITY: ICD-10-CM

## 2024-05-21 DIAGNOSIS — L97.822 NON-PRESSURE CHRONIC ULCER OF OTHER PART OF LEFT LOWER LEG WITH FAT LAYER EXPOSED: ICD-10-CM

## 2024-05-21 DIAGNOSIS — M19.90 UNSPECIFIED OSTEOARTHRITIS, UNSPECIFIED SITE: ICD-10-CM

## 2024-05-23 ENCOUNTER — OUTPATIENT (OUTPATIENT)
Dept: OUTPATIENT SERVICES | Facility: HOSPITAL | Age: 64
LOS: 1 days | End: 2024-05-23
Payer: COMMERCIAL

## 2024-05-23 DIAGNOSIS — I73.9 PERIPHERAL VASCULAR DISEASE, UNSPECIFIED: ICD-10-CM

## 2024-05-23 PROCEDURE — 99213 OFFICE O/P EST LOW 20 MIN: CPT

## 2024-05-23 PROCEDURE — 29580 STRAPPING UNNA BOOT: CPT | Mod: LT

## 2024-05-27 ENCOUNTER — EMERGENCY (EMERGENCY)
Facility: HOSPITAL | Age: 64
LOS: 0 days | Discharge: HOME | End: 2024-05-27
Attending: STUDENT IN AN ORGANIZED HEALTH CARE EDUCATION/TRAINING PROGRAM
Payer: COMMERCIAL

## 2024-05-27 VITALS
WEIGHT: 276.9 LBS | SYSTOLIC BLOOD PRESSURE: 156 MMHG | TEMPERATURE: 98 F | RESPIRATION RATE: 18 BRPM | DIASTOLIC BLOOD PRESSURE: 78 MMHG | HEART RATE: 81 BPM | OXYGEN SATURATION: 96 %

## 2024-05-27 VITALS
DIASTOLIC BLOOD PRESSURE: 92 MMHG | OXYGEN SATURATION: 98 % | SYSTOLIC BLOOD PRESSURE: 162 MMHG | RESPIRATION RATE: 18 BRPM | HEART RATE: 75 BPM

## 2024-05-27 DIAGNOSIS — M79.671 PAIN IN RIGHT FOOT: ICD-10-CM

## 2024-05-27 DIAGNOSIS — M79.672 PAIN IN LEFT FOOT: ICD-10-CM

## 2024-05-27 DIAGNOSIS — I87.2 VENOUS INSUFFICIENCY (CHRONIC) (PERIPHERAL): ICD-10-CM

## 2024-05-27 DIAGNOSIS — Z88.1 ALLERGY STATUS TO OTHER ANTIBIOTIC AGENTS STATUS: ICD-10-CM

## 2024-05-27 DIAGNOSIS — I48.91 UNSPECIFIED ATRIAL FIBRILLATION: ICD-10-CM

## 2024-05-27 DIAGNOSIS — Z88.2 ALLERGY STATUS TO SULFONAMIDES: ICD-10-CM

## 2024-05-27 DIAGNOSIS — L97.519 NON-PRESSURE CHRONIC ULCER OF OTHER PART OF RIGHT FOOT WITH UNSPECIFIED SEVERITY: ICD-10-CM

## 2024-05-27 DIAGNOSIS — L03.115 CELLULITIS OF RIGHT LOWER LIMB: ICD-10-CM

## 2024-05-27 DIAGNOSIS — G70.00 MYASTHENIA GRAVIS WITHOUT (ACUTE) EXACERBATION: ICD-10-CM

## 2024-05-27 LAB
ALBUMIN SERPL ELPH-MCNC: 3.6 G/DL — SIGNIFICANT CHANGE UP (ref 3.3–5)
ALP SERPL-CCNC: 54 U/L — SIGNIFICANT CHANGE UP (ref 40–120)
ALT FLD-CCNC: 22 U/L — SIGNIFICANT CHANGE UP (ref 12–78)
ANION GAP SERPL CALC-SCNC: 2 MMOL/L — LOW (ref 5–17)
AST SERPL-CCNC: 20 U/L — SIGNIFICANT CHANGE UP (ref 15–37)
BASOPHILS # BLD AUTO: 0.11 K/UL — SIGNIFICANT CHANGE UP (ref 0–0.2)
BASOPHILS NFR BLD AUTO: 1.1 % — SIGNIFICANT CHANGE UP (ref 0–2)
BILIRUB SERPL-MCNC: 0.9 MG/DL — SIGNIFICANT CHANGE UP (ref 0.2–1.2)
BUN SERPL-MCNC: 13 MG/DL — SIGNIFICANT CHANGE UP (ref 7–23)
CALCIUM SERPL-MCNC: 9.4 MG/DL — SIGNIFICANT CHANGE UP (ref 8.5–10.1)
CHLORIDE SERPL-SCNC: 110 MMOL/L — HIGH (ref 96–108)
CO2 SERPL-SCNC: 27 MMOL/L — SIGNIFICANT CHANGE UP (ref 22–31)
CREAT SERPL-MCNC: 0.9 MG/DL — SIGNIFICANT CHANGE UP (ref 0.5–1.3)
EGFR: 96 ML/MIN/1.73M2 — SIGNIFICANT CHANGE UP
EOSINOPHIL # BLD AUTO: 0.3 K/UL — SIGNIFICANT CHANGE UP (ref 0–0.5)
EOSINOPHIL NFR BLD AUTO: 3.1 % — SIGNIFICANT CHANGE UP (ref 0–6)
GLUCOSE SERPL-MCNC: 90 MG/DL — SIGNIFICANT CHANGE UP (ref 70–99)
HCT VFR BLD CALC: 48.6 % — SIGNIFICANT CHANGE UP (ref 39–50)
HGB BLD-MCNC: 15.1 G/DL — SIGNIFICANT CHANGE UP (ref 13–17)
IMM GRANULOCYTES NFR BLD AUTO: 0.8 % — SIGNIFICANT CHANGE UP (ref 0–0.9)
LYMPHOCYTES # BLD AUTO: 1.06 K/UL — SIGNIFICANT CHANGE UP (ref 1–3.3)
LYMPHOCYTES # BLD AUTO: 11 % — LOW (ref 13–44)
MCHC RBC-ENTMCNC: 28.7 PG — SIGNIFICANT CHANGE UP (ref 27–34)
MCHC RBC-ENTMCNC: 31.1 GM/DL — LOW (ref 32–36)
MCV RBC AUTO: 92.4 FL — SIGNIFICANT CHANGE UP (ref 80–100)
MONOCYTES # BLD AUTO: 0.66 K/UL — SIGNIFICANT CHANGE UP (ref 0–0.9)
MONOCYTES NFR BLD AUTO: 6.8 % — SIGNIFICANT CHANGE UP (ref 2–14)
NEUTROPHILS # BLD AUTO: 7.47 K/UL — HIGH (ref 1.8–7.4)
NEUTROPHILS NFR BLD AUTO: 77.2 % — HIGH (ref 43–77)
PLATELET # BLD AUTO: 331 K/UL — SIGNIFICANT CHANGE UP (ref 150–400)
POTASSIUM SERPL-MCNC: 3.9 MMOL/L — SIGNIFICANT CHANGE UP (ref 3.5–5.3)
POTASSIUM SERPL-SCNC: 3.9 MMOL/L — SIGNIFICANT CHANGE UP (ref 3.5–5.3)
PROT SERPL-MCNC: 7.5 GM/DL — SIGNIFICANT CHANGE UP (ref 6–8.3)
RBC # BLD: 5.26 M/UL — SIGNIFICANT CHANGE UP (ref 4.2–5.8)
RBC # FLD: 13.5 % — SIGNIFICANT CHANGE UP (ref 10.3–14.5)
SODIUM SERPL-SCNC: 139 MMOL/L — SIGNIFICANT CHANGE UP (ref 135–145)
WBC # BLD: 9.68 K/UL — SIGNIFICANT CHANGE UP (ref 3.8–10.5)
WBC # FLD AUTO: 9.68 K/UL — SIGNIFICANT CHANGE UP (ref 3.8–10.5)

## 2024-05-27 PROCEDURE — 80053 COMPREHEN METABOLIC PANEL: CPT

## 2024-05-27 PROCEDURE — 73630 X-RAY EXAM OF FOOT: CPT | Mod: 26,RT

## 2024-05-27 PROCEDURE — 96372 THER/PROPH/DIAG INJ SC/IM: CPT | Mod: XU

## 2024-05-27 PROCEDURE — 99285 EMERGENCY DEPT VISIT HI MDM: CPT

## 2024-05-27 PROCEDURE — 85025 COMPLETE CBC W/AUTO DIFF WBC: CPT

## 2024-05-27 PROCEDURE — 73630 X-RAY EXAM OF FOOT: CPT | Mod: RT

## 2024-05-27 PROCEDURE — 99284 EMERGENCY DEPT VISIT MOD MDM: CPT | Mod: 25

## 2024-05-27 PROCEDURE — 36415 COLL VENOUS BLD VENIPUNCTURE: CPT

## 2024-05-27 RX ORDER — MORPHINE SULFATE 50 MG/1
4 CAPSULE, EXTENDED RELEASE ORAL ONCE
Refills: 0 | Status: DISCONTINUED | OUTPATIENT
Start: 2024-05-27 | End: 2024-05-27

## 2024-05-27 RX ORDER — OXYCODONE HYDROCHLORIDE 5 MG/1
1 TABLET ORAL
Qty: 12 | Refills: 0
Start: 2024-05-27 | End: 2024-05-29

## 2024-05-27 RX ADMIN — MORPHINE SULFATE 4 MILLIGRAM(S): 50 CAPSULE, EXTENDED RELEASE ORAL at 11:39

## 2024-05-27 RX ADMIN — Medication 1 TABLET(S): at 12:45

## 2024-05-27 NOTE — ED STATDOCS - PATIENT PORTAL LINK FT
You can access the FollowMyHealth Patient Portal offered by [facility] by registering at the following website: [email or web address]. By joining [employer]’s FollowMyHealth portal, you will also be able to view your health information using other applications (apps) compatible with our system.

## 2024-05-27 NOTE — ED STATDOCS - SKIN, MLM
skin normal color for race, warm, dry and intact. Right leg erythema extending from forefoot all the way up shin. Slight purple discoloration consistent with venous insufficiency. Foot warm to touch. Not blue or cyanotic. Ulceration on top of right foot just below toes that is scabbing over. No pus or crepitus. Foot nontender.

## 2024-05-27 NOTE — ED STATDOCS - OBJECTIVE STATEMENT
64 y/o male with a PMHx of Afib, myasthenia gravis presents to the ED c/o b/l foot pain. Pt was seen in ED on 5/6 and was diagnosed with cellulitis. Pt follows at the wound center. Pt saw Dr. Harris last week and was told he needs abx but the abx were never sent to the pharmacy. Pt had Doxycyline at home and took a few doses. No other complaints at this time.

## 2024-05-27 NOTE — ED ADULT TRIAGE NOTE - CHIEF COMPLAINT QUOTE
Pt to ED c/o bilateral foot pain. Reports both feet are red and swollen. Pt with multiple wounds, RN unable to visualize in triage. Reports being a wound clinic patient. Seen on Thursday. Dx Cellulitis. Was supposed to get a prescription for antibiotics but never did. Today reports pain exacerbated. denies fevers.

## 2024-05-27 NOTE — ED STATDOCS - PROGRESS NOTE DETAILS
Noted erythema to RLE with dorsum of foot ulceration with blackened scabbing.  Podiatry contacted to consult as his podiatrist is Dr. Castanon.  Sabiha Johnston PA-C 62 y/o male with a PMHx of Afib, myasthenia gravis presents to the ED c/o b/l foot pain. Pt was seen in ED on 5/6 and was diagnosed with cellulitis. Pt follows at the wound center. Pt saw Dr. Harris last week and was told he needs abx but the abx were never sent to the pharmacy. Pt had Doxycyline at home and took a few doses. No other complaints at this time. Podiatry resident evaluated patient and consulted with Dr. Castanon.  DC with Augmentin.  Follow with Dr. Castanon.  Sabiha Johnston PA-C

## 2024-05-27 NOTE — ED STATDOCS - CARE PROVIDER_API CALL
Solomon Castanon  Foot and Ankle Surgery  [address], [address]  [place] [address]  Phone: [phone]  Fax: [phone]  Follow Up Time:

## 2024-05-27 NOTE — ED ADULT NURSE NOTE - OBJECTIVE STATEMENT
64 y/o male awake alert and oriented x4 presents to ED c/o foot pain x couple of weeks. Pt was diagnosed with cellulitis and have been following up at the wound center. Pt have been taking doxycycline at home. No other complaints at this time. Denies fever/chills. + redness and burning sensation.

## 2024-05-27 NOTE — ED STATDOCS - NSFOLLOWUPINSTRUCTIONS_ED_ALL_ED_FT
Chronic Venous Insufficiency  Chronic venous insufficiency is a condition that causes the veins in the legs to struggle to pump blood from the legs to the heart. It is also called venous stasis.    This condition can happen when the vein walls are stretched, weakened, or damaged. It can also happen when the valves inside the vein are damaged. With the right treatment, you should be able to still lead an active life.    What are the causes?  Common causes of this condition include:  Venous hypertension. This is high blood pressure inside the veins.  Sitting or standing too long. This can cause increased blood pressure in the veins of the leg.  Deep vein thrombosis (DVT). This is a blood clot that blocks blood flow in a vein.  Phlebitis. This is inflammation of a vein. It can cause a blood clot to form.  An abnormal growth of cells (tumor) in the area between your hip bones (pelvis). This can cause blood to back up.  What increases the risk?  Factors that may make you more likely to get this condition include:  Having a family history of the condition.  Being overweight.  Being pregnant.  Not getting enough exercise.  Smoking.  Having a job that requires you to sit or  one place for a long time.  Being a certain age. Females in their 40s and 50s and males in their 70s are more likely to get this condition.  What are the signs or symptoms?  Symptoms of this condition include:  Varicose veins. These are veins that are enlarged, bulging, or twisted.  Skin breakdown or ulcers.  Reddened skin or dark discoloration of the skin on the leg between the knee and ankle.  Lipodermatosclerosis. This is brown, smooth, tight, and painful skin just above the ankle. It is often on the inside of the leg.  Swelling of the legs.  How is this diagnosed?  This condition may be diagnosed based on your medical history and a physical exam. You may also need tests, such as:  A duplex ultrasound. This shows how blood flows through a blood vessel.  Plethysmography. This tests blood flow.  Venogram. This looks at the veins using an X-ray and dye.  How is this treated?  Compression stockings on a person's lower legs.  The goals of treatment are to help you return to an active life and to relieve pain. Treatment may include:  Wearing compression stockings. These do not cure the condition but can help relieve symptoms. They can also help stop your condition from getting worse.  Sclerotherapy. This involves injecting a solution to shrink damaged veins.  Surgery. This may include:  Vein stripping. This is when a diseased vein is taken out.  Laser ablation surgery. This is when blood flow is cut off through the vein.  Repairing or remaking a valve inside the affected vein.  Follow these instructions at home:  Lifestyle    Do not use any products that contain nicotine or tobacco. These products include cigarettes, chewing tobacco, and vaping devices, such as e-cigarettes. If you need help quitting, ask your health care provider.  Stay active. Exercise, walk, or do other activities. Ask your provider what activities are safe for you.  General instructions    Take over-the-counter and prescription medicines only as told by your provider.  Drink enough fluid to keep your pee (urine) pale yellow.  Wear compression stockings as told by your provider. These stockings help to prevent blood clots and reduce swelling in your legs.  Keep all follow-up visits. Your provider will check your legs for any changes and adjust your treatment plan as needed.  Contact a health care provider if:  You have redness, swelling, or more pain in the affected area.  You see a red streak or line that goes up or down from the area.  You have skin breakdown or skin loss.  You get an injury in the affected area.  You get a fever.  Get help right away if:  You have severe pain that does not get better with medicine.  You get an injury and an open wound in the affected area.  Your foot or ankle becomes numb or weak all of a sudden.  You have trouble moving your foot or ankle.  Your symptoms do not go away or get worse.  You have chest pain.  You have shortness of breath.  These symptoms may be an emergency. Get help right away. Call 911.  Do not wait to see if the symptoms will go away.  Do not drive yourself to the hospital.  This information is not intended to replace advice given to you by your health care provider. Make sure you discuss any questions you have with your health care provider.    Cellulitis, Adult  A person's legs and feet. One leg is normal and the other leg is affected by cellulitis.  Cellulitis is a skin infection. The infected area is usually warm, red, swollen, and tender. It most commonly occurs on the lower body, such as the legs, feet, and toes, but this condition can occur on any part of the body. The infection can travel to the muscles, blood, and underlying tissue and become life-threatening without treatment. It is important to get medical treatment right away for this condition.    What are the causes?  Cellulitis is caused by bacteria. The bacteria enter through a break in the skin, such as a cut, burn, insect or animal bite, open sore, or crack.    What increases the risk?  This condition is more likely to occur in people who:  Have a weak body's defense system (immune system).  Are older than 60 years old.  Have diabetes.  Have a type of long-term (chronic) liver disease (cirrhosis) or kidney disease.  Are obese.  Have a skin condition such as:  An itchy rash, such as eczema or psoriasis.  A fungal rash on the feet or in skinfolds.  Blistering rashes, such as shingles or chickenpox.  Slow movement of blood in the veins (venous stasis).  Fluid buildup below the skin (edema).  Have open wounds on the skin, such as cuts, puncture wounds, burns, bites, scrapes, tattoos, piercings, or wounds from surgery.  Have had radiation therapy.  Use IV drugs.  What are the signs or symptoms?  Symptoms of this condition include:  Skin that looks red, purple, or slightly darker than your usual skin color.  Streaks or spots on the skin.  Swollen area of the skin.  Tenderness or pain when an area of the skin is touched.  Warm skin.  Fever or chills.  Blisters.  Tiredness (fatigue).  How is this diagnosed?  This condition is diagnosed based on a medical history and physical exam. You may also have tests, including:  Blood tests.  Imaging tests.  Tests on a sample of fluid taken from the wound (wound culture).  How is this treated?  Treatment for this condition may include:  Medicines. These may include antibiotics or medicines to treat allergies (antihistamines).  Rest.  Applying cold or warm wet cloths (compresses) to the skin.  If the condition is severe, you may need to stay in the hospital and get antibiotics through an IV.  The infection usually starts to get better within 1–2 days of treatment.    Follow these instructions at home:  Medicines    Take over-the-counter and prescription medicines only as told by your health care provider.  If you were prescribed antibiotics, take them as told by your provider. Do not stop using the antibiotic even if you start to feel better.  General instructions    Drink enough fluid to keep your pee (urine) pale yellow.  Do not touch or rub the infected area.  Raise (elevate) the infected area above the level of your heart while you are sitting or lying down.  Return to your normal activities as told by your provider. Ask your provider what activities are safe for you.  Apply warm or cold compresses to the affected area as told by your provider.  Keep all follow-up visits. Your provider will need to make sure that a more serious infection is not developing.  Contact a health care provider if:  You have a fever.  Your symptoms do not improve within 1–2 days of starting treatment or you develop new symptoms.  Your bone or joint underneath the infected area becomes painful after the skin has healed.  Your infection returns in the same area or another area. Signs of this may include:  You notice a swollen bump in the infected area.  Your red area gets larger, turns dark in color, or becomes more painful.  Drainage increases.  Pus or a bad smell develops in your infected area.  You have more pain.  You feel ill and have muscle aches and weakness.  You develop vomiting or diarrhea that will not go away.  Get help right away if:  You notice red streaks coming from the infected area.  You notice the skin turns purple or black and falls off.  This symptom may be an emergency. Get help right away. Call 911.  Do not wait to see if the symptom will go away.  Do not drive yourself to the hospital.  This information is not intended to replace advice given to you by your health care provider. Make sure you discuss any questions you have with your health care provider.

## 2024-05-27 NOTE — ED STATDOCS - CLINICAL SUMMARY MEDICAL DECISION MAKING FREE TEXT BOX
Adult male hx of venous insufficiency presents with worsening wound to right foot. Pt had recent vascular workup who states he has good blood flow to the leg. Pt was supposed to be sent abx but never got them. Vitals normal. No fevers. Plan: XR foot, podiatry consult, labs, reassess. Diagnosis favored to be chronic venous insufficiency over cellulitis. Anticipate d.c with both vascular and podiatry follow up.

## 2024-05-27 NOTE — ED STATDOCS - ATTENDING APP SHARED VISIT CONTRIBUTION OF CARE
I, Brett Duarte, DO personally saw the patient with DARWIN.  I have personally performed a face to face diagnostic evaluation on this patient.  I have reviewed the DARWIN note and agree with the history, exam, and plan of care, except as noted.  I personally saw the patient and performed a substantive portion of the visit including all aspects of the medical decision making.

## 2024-05-31 ENCOUNTER — OUTPATIENT (OUTPATIENT)
Dept: OUTPATIENT SERVICES | Facility: HOSPITAL | Age: 64
LOS: 1 days | End: 2024-05-31
Payer: COMMERCIAL

## 2024-05-31 DIAGNOSIS — I73.9 PERIPHERAL VASCULAR DISEASE, UNSPECIFIED: ICD-10-CM

## 2024-05-31 PROCEDURE — 11042 DBRDMT SUBQ TIS 1ST 20SQCM/<: CPT

## 2024-05-31 PROCEDURE — 11045 DBRDMT SUBQ TISS EACH ADDL: CPT

## 2024-06-04 ENCOUNTER — INPATIENT (INPATIENT)
Facility: HOSPITAL | Age: 64
LOS: 4 days | Discharge: HOME | DRG: 638 | End: 2024-06-09
Attending: HOSPITALIST | Admitting: INTERNAL MEDICINE
Payer: COMMERCIAL

## 2024-06-04 VITALS
OXYGEN SATURATION: 97 % | SYSTOLIC BLOOD PRESSURE: 150 MMHG | TEMPERATURE: 99 F | RESPIRATION RATE: 18 BRPM | HEART RATE: 89 BPM | DIASTOLIC BLOOD PRESSURE: 69 MMHG

## 2024-06-04 DIAGNOSIS — E11.621 TYPE 2 DIABETES MELLITUS WITH FOOT ULCER: ICD-10-CM

## 2024-06-04 LAB
ALBUMIN SERPL ELPH-MCNC: 3.3 G/DL — SIGNIFICANT CHANGE UP (ref 3.3–5)
ALP SERPL-CCNC: 68 U/L — SIGNIFICANT CHANGE UP (ref 40–120)
ALT FLD-CCNC: 28 U/L — SIGNIFICANT CHANGE UP (ref 12–78)
ANION GAP SERPL CALC-SCNC: 1 MMOL/L — LOW (ref 5–17)
APTT BLD: 36.9 SEC — HIGH (ref 24.5–35.6)
AST SERPL-CCNC: 26 U/L — SIGNIFICANT CHANGE UP (ref 15–37)
BASOPHILS # BLD AUTO: 0.07 K/UL — SIGNIFICANT CHANGE UP (ref 0–0.2)
BASOPHILS NFR BLD AUTO: 0.9 % — SIGNIFICANT CHANGE UP (ref 0–2)
BILIRUB SERPL-MCNC: 0.7 MG/DL — SIGNIFICANT CHANGE UP (ref 0.2–1.2)
BUN SERPL-MCNC: 16 MG/DL — SIGNIFICANT CHANGE UP (ref 7–23)
CALCIUM SERPL-MCNC: 8.8 MG/DL — SIGNIFICANT CHANGE UP (ref 8.5–10.1)
CHLORIDE SERPL-SCNC: 110 MMOL/L — HIGH (ref 96–108)
CO2 SERPL-SCNC: 27 MMOL/L — SIGNIFICANT CHANGE UP (ref 22–31)
CREAT SERPL-MCNC: 0.84 MG/DL — SIGNIFICANT CHANGE UP (ref 0.5–1.3)
EGFR: 98 ML/MIN/1.73M2 — SIGNIFICANT CHANGE UP
EOSINOPHIL # BLD AUTO: 0.06 K/UL — SIGNIFICANT CHANGE UP (ref 0–0.5)
EOSINOPHIL NFR BLD AUTO: 0.7 % — SIGNIFICANT CHANGE UP (ref 0–6)
GLUCOSE SERPL-MCNC: 106 MG/DL — HIGH (ref 70–99)
HCT VFR BLD CALC: 43.8 % — SIGNIFICANT CHANGE UP (ref 39–50)
HGB BLD-MCNC: 13.9 G/DL — SIGNIFICANT CHANGE UP (ref 13–17)
IMM GRANULOCYTES NFR BLD AUTO: 0.7 % — SIGNIFICANT CHANGE UP (ref 0–0.9)
INR BLD: 1.63 RATIO — HIGH (ref 0.85–1.18)
LACTATE SERPL-SCNC: 1.3 MMOL/L — SIGNIFICANT CHANGE UP (ref 0.7–2)
LYMPHOCYTES # BLD AUTO: 0.74 K/UL — LOW (ref 1–3.3)
LYMPHOCYTES # BLD AUTO: 9.1 % — LOW (ref 13–44)
MCHC RBC-ENTMCNC: 29.6 PG — SIGNIFICANT CHANGE UP (ref 27–34)
MCHC RBC-ENTMCNC: 31.7 GM/DL — LOW (ref 32–36)
MCV RBC AUTO: 93.2 FL — SIGNIFICANT CHANGE UP (ref 80–100)
MONOCYTES # BLD AUTO: 0.72 K/UL — SIGNIFICANT CHANGE UP (ref 0–0.9)
MONOCYTES NFR BLD AUTO: 8.8 % — SIGNIFICANT CHANGE UP (ref 2–14)
NEUTROPHILS # BLD AUTO: 6.49 K/UL — SIGNIFICANT CHANGE UP (ref 1.8–7.4)
NEUTROPHILS NFR BLD AUTO: 79.8 % — HIGH (ref 43–77)
PLATELET # BLD AUTO: 351 K/UL — SIGNIFICANT CHANGE UP (ref 150–400)
POTASSIUM SERPL-MCNC: 4.8 MMOL/L — SIGNIFICANT CHANGE UP (ref 3.5–5.3)
POTASSIUM SERPL-SCNC: 4.8 MMOL/L — SIGNIFICANT CHANGE UP (ref 3.5–5.3)
PROT SERPL-MCNC: 6.9 GM/DL — SIGNIFICANT CHANGE UP (ref 6–8.3)
PROTHROM AB SERPL-ACNC: 18.2 SEC — HIGH (ref 9.5–13)
RBC # BLD: 4.7 M/UL — SIGNIFICANT CHANGE UP (ref 4.2–5.8)
RBC # FLD: 13.3 % — SIGNIFICANT CHANGE UP (ref 10.3–14.5)
SODIUM SERPL-SCNC: 138 MMOL/L — SIGNIFICANT CHANGE UP (ref 135–145)
WBC # BLD: 8.14 K/UL — SIGNIFICANT CHANGE UP (ref 3.8–10.5)
WBC # FLD AUTO: 8.14 K/UL — SIGNIFICANT CHANGE UP (ref 3.8–10.5)

## 2024-06-04 PROCEDURE — 85025 COMPLETE CBC W/AUTO DIFF WBC: CPT

## 2024-06-04 PROCEDURE — 36415 COLL VENOUS BLD VENIPUNCTURE: CPT

## 2024-06-04 PROCEDURE — 85652 RBC SED RATE AUTOMATED: CPT

## 2024-06-04 PROCEDURE — 80053 COMPREHEN METABOLIC PANEL: CPT

## 2024-06-04 PROCEDURE — 93010 ELECTROCARDIOGRAM REPORT: CPT

## 2024-06-04 PROCEDURE — 80061 LIPID PANEL: CPT

## 2024-06-04 PROCEDURE — 86140 C-REACTIVE PROTEIN: CPT

## 2024-06-04 PROCEDURE — 80048 BASIC METABOLIC PNL TOTAL CA: CPT

## 2024-06-04 PROCEDURE — 73630 X-RAY EXAM OF FOOT: CPT | Mod: 26,RT

## 2024-06-04 PROCEDURE — 85610 PROTHROMBIN TIME: CPT

## 2024-06-04 PROCEDURE — 99285 EMERGENCY DEPT VISIT HI MDM: CPT

## 2024-06-04 PROCEDURE — 83036 HEMOGLOBIN GLYCOSYLATED A1C: CPT

## 2024-06-04 PROCEDURE — 85027 COMPLETE CBC AUTOMATED: CPT

## 2024-06-04 RX ORDER — LINEZOLID 600 MG/300ML
600 INJECTION, SOLUTION INTRAVENOUS ONCE
Refills: 0 | Status: COMPLETED | OUTPATIENT
Start: 2024-06-04 | End: 2024-06-04

## 2024-06-04 RX ADMIN — LINEZOLID 300 MILLIGRAM(S): 600 INJECTION, SOLUTION INTRAVENOUS at 21:51

## 2024-06-04 RX ADMIN — LINEZOLID 600 MILLIGRAM(S): 600 INJECTION, SOLUTION INTRAVENOUS at 22:51

## 2024-06-04 NOTE — ED PROVIDER NOTE - CLINICAL SUMMARY MEDICAL DECISION MAKING FREE TEXT BOX
no soft tissue gas failed outpatient antibiotics will require admission based of this culture from PCP patient has allergic reaction to IV vancomycin culture results in patient's folder organism sensitive to IV linezolid prescribed requires admission to hospital for IV antibiotics for worsening right diabetic foot ulcer

## 2024-06-04 NOTE — ED ADULT NURSE NOTE - CHIEF COMPLAINT QUOTE
Pt presents to the ED c/o wounds to the right foot, left foot, and left leg. Pt had right foot wound culture performed, which was positive. Pt has been taking Percocet for pain. Denies fevers. Pt told to come to the ED by Dr. Thorne for IV antibiotics.

## 2024-06-04 NOTE — ED PROVIDER NOTE - OBJECTIVE STATEMENT
Patient presents to ED describing chronic right foot ulcer had culture of right dorsum foot ulcer taken by PCP last Friday returned today showing staphylococcus haemolyticus.  he has already taken over the last month doxycycline and Augmentin for this wound however getting worse no fevers no chest pain no shortness of breath no abdominal pain no other acute issues symptoms or concerns

## 2024-06-04 NOTE — ED PROVIDER NOTE - SKIN, MLM
+  right dorsum foot skin ulceration necrotic hot some purulent discharge no crepitus normal range of motion right ankle

## 2024-06-04 NOTE — ED ADULT NURSE NOTE - OBJECTIVE STATEMENT
Ambulatory to ER with c/o b/l foot wounds sent by PMD for further eval. Patient a & ox4, respirations even and unlabored on room air. Await MD galarza.

## 2024-06-05 DIAGNOSIS — L97.822 NON-PRESSURE CHRONIC ULCER OF OTHER PART OF LEFT LOWER LEG WITH FAT LAYER EXPOSED: ICD-10-CM

## 2024-06-05 DIAGNOSIS — L97.518 NON-PRESSURE CHRONIC ULCER OF OTHER PART OF RIGHT FOOT WITH OTHER SPECIFIED SEVERITY: ICD-10-CM

## 2024-06-05 DIAGNOSIS — L03.116 CELLULITIS OF LEFT LOWER LIMB: ICD-10-CM

## 2024-06-05 DIAGNOSIS — I87.312 CHRONIC VENOUS HYPERTENSION (IDIOPATHIC) WITH ULCER OF LEFT LOWER EXTREMITY: ICD-10-CM

## 2024-06-05 DIAGNOSIS — L97.528 NON-PRESSURE CHRONIC ULCER OF OTHER PART OF LEFT FOOT WITH OTHER SPECIFIED SEVERITY: ICD-10-CM

## 2024-06-05 DIAGNOSIS — I10 ESSENTIAL (PRIMARY) HYPERTENSION: ICD-10-CM

## 2024-06-05 DIAGNOSIS — J45.998 OTHER ASTHMA: ICD-10-CM

## 2024-06-05 DIAGNOSIS — M19.90 UNSPECIFIED OSTEOARTHRITIS, UNSPECIFIED SITE: ICD-10-CM

## 2024-06-05 DIAGNOSIS — I89.0 LYMPHEDEMA, NOT ELSEWHERE CLASSIFIED: ICD-10-CM

## 2024-06-05 LAB
A1C WITH ESTIMATED AVERAGE GLUCOSE RESULT: 6 % — HIGH (ref 4–5.6)
ALBUMIN SERPL ELPH-MCNC: 3 G/DL — LOW (ref 3.3–5)
ALP SERPL-CCNC: 50 U/L — SIGNIFICANT CHANGE UP (ref 40–120)
ALT FLD-CCNC: 27 U/L — SIGNIFICANT CHANGE UP (ref 12–78)
ANION GAP SERPL CALC-SCNC: 2 MMOL/L — LOW (ref 5–17)
AST SERPL-CCNC: 23 U/L — SIGNIFICANT CHANGE UP (ref 15–37)
BASOPHILS # BLD AUTO: 0.06 K/UL — SIGNIFICANT CHANGE UP (ref 0–0.2)
BASOPHILS NFR BLD AUTO: 0.8 % — SIGNIFICANT CHANGE UP (ref 0–2)
BILIRUB SERPL-MCNC: 0.7 MG/DL — SIGNIFICANT CHANGE UP (ref 0.2–1.2)
BUN SERPL-MCNC: 17 MG/DL — SIGNIFICANT CHANGE UP (ref 7–23)
CALCIUM SERPL-MCNC: 8.5 MG/DL — SIGNIFICANT CHANGE UP (ref 8.5–10.1)
CHLORIDE SERPL-SCNC: 109 MMOL/L — HIGH (ref 96–108)
CHOLEST SERPL-MCNC: 156 MG/DL — SIGNIFICANT CHANGE UP
CO2 SERPL-SCNC: 27 MMOL/L — SIGNIFICANT CHANGE UP (ref 22–31)
CREAT SERPL-MCNC: 0.86 MG/DL — SIGNIFICANT CHANGE UP (ref 0.5–1.3)
CRP SERPL-MCNC: 5 MG/L — HIGH
EGFR: 97 ML/MIN/1.73M2 — SIGNIFICANT CHANGE UP
EOSINOPHIL # BLD AUTO: 0.18 K/UL — SIGNIFICANT CHANGE UP (ref 0–0.5)
EOSINOPHIL NFR BLD AUTO: 2.4 % — SIGNIFICANT CHANGE UP (ref 0–6)
ERYTHROCYTE [SEDIMENTATION RATE] IN BLOOD: 12 MM/HR — SIGNIFICANT CHANGE UP (ref 0–20)
ESTIMATED AVERAGE GLUCOSE: 126 MG/DL — HIGH (ref 68–114)
GLUCOSE SERPL-MCNC: 88 MG/DL — SIGNIFICANT CHANGE UP (ref 70–99)
HCT VFR BLD CALC: 42.2 % — SIGNIFICANT CHANGE UP (ref 39–50)
HDLC SERPL-MCNC: 49 MG/DL — SIGNIFICANT CHANGE UP
HGB BLD-MCNC: 13.3 G/DL — SIGNIFICANT CHANGE UP (ref 13–17)
IMM GRANULOCYTES NFR BLD AUTO: 1.1 % — HIGH (ref 0–0.9)
INR BLD: 1.63 RATIO — HIGH (ref 0.85–1.18)
LIPID PNL WITH DIRECT LDL SERPL: 87 MG/DL — SIGNIFICANT CHANGE UP
LYMPHOCYTES # BLD AUTO: 1.63 K/UL — SIGNIFICANT CHANGE UP (ref 1–3.3)
LYMPHOCYTES # BLD AUTO: 21.9 % — SIGNIFICANT CHANGE UP (ref 13–44)
MCHC RBC-ENTMCNC: 29.4 PG — SIGNIFICANT CHANGE UP (ref 27–34)
MCHC RBC-ENTMCNC: 31.5 GM/DL — LOW (ref 32–36)
MCV RBC AUTO: 93.2 FL — SIGNIFICANT CHANGE UP (ref 80–100)
MONOCYTES # BLD AUTO: 0.69 K/UL — SIGNIFICANT CHANGE UP (ref 0–0.9)
MONOCYTES NFR BLD AUTO: 9.3 % — SIGNIFICANT CHANGE UP (ref 2–14)
NEUTROPHILS # BLD AUTO: 4.79 K/UL — SIGNIFICANT CHANGE UP (ref 1.8–7.4)
NEUTROPHILS NFR BLD AUTO: 64.5 % — SIGNIFICANT CHANGE UP (ref 43–77)
NON HDL CHOLESTEROL: 107 MG/DL — SIGNIFICANT CHANGE UP
PLATELET # BLD AUTO: 324 K/UL — SIGNIFICANT CHANGE UP (ref 150–400)
POTASSIUM SERPL-MCNC: 4.1 MMOL/L — SIGNIFICANT CHANGE UP (ref 3.5–5.3)
POTASSIUM SERPL-SCNC: 4.1 MMOL/L — SIGNIFICANT CHANGE UP (ref 3.5–5.3)
PROT SERPL-MCNC: 6.3 GM/DL — SIGNIFICANT CHANGE UP (ref 6–8.3)
PROTHROM AB SERPL-ACNC: 18.2 SEC — HIGH (ref 9.5–13)
RBC # BLD: 4.53 M/UL — SIGNIFICANT CHANGE UP (ref 4.2–5.8)
RBC # FLD: 13.5 % — SIGNIFICANT CHANGE UP (ref 10.3–14.5)
SODIUM SERPL-SCNC: 138 MMOL/L — SIGNIFICANT CHANGE UP (ref 135–145)
TRIGL SERPL-MCNC: 109 MG/DL — SIGNIFICANT CHANGE UP
WBC # BLD: 7.43 K/UL — SIGNIFICANT CHANGE UP (ref 3.8–10.5)
WBC # FLD AUTO: 7.43 K/UL — SIGNIFICANT CHANGE UP (ref 3.8–10.5)

## 2024-06-05 PROCEDURE — 99223 1ST HOSP IP/OBS HIGH 75: CPT

## 2024-06-05 RX ORDER — GABAPENTIN 400 MG/1
300 CAPSULE ORAL
Refills: 0 | Status: DISCONTINUED | OUTPATIENT
Start: 2024-06-05 | End: 2024-06-09

## 2024-06-05 RX ORDER — LINEZOLID 600 MG/300ML
600 INJECTION, SOLUTION INTRAVENOUS EVERY 12 HOURS
Refills: 0 | Status: DISCONTINUED | OUTPATIENT
Start: 2024-06-05 | End: 2024-06-07

## 2024-06-05 RX ORDER — AZATHIOPRINE 100 MG/1
50 TABLET ORAL
Refills: 0 | Status: DISCONTINUED | OUTPATIENT
Start: 2024-06-05 | End: 2024-06-09

## 2024-06-05 RX ORDER — DIGOXIN 250 MCG
1 TABLET ORAL
Qty: 0 | Refills: 0 | DISCHARGE

## 2024-06-05 RX ORDER — WARFARIN SODIUM 2.5 MG/1
10 TABLET ORAL
Refills: 0 | DISCHARGE

## 2024-06-05 RX ORDER — FLUTICASONE PROPIONATE AND SALMETEROL 50; 250 UG/1; UG/1
1 POWDER ORAL; RESPIRATORY (INHALATION)
Refills: 0 | DISCHARGE

## 2024-06-05 RX ORDER — AZATHIOPRINE 100 MG/1
1 TABLET ORAL
Qty: 0 | Refills: 0 | DISCHARGE

## 2024-06-05 RX ORDER — DIGOXIN 250 MCG
250 TABLET ORAL DAILY
Refills: 0 | Status: DISCONTINUED | OUTPATIENT
Start: 2024-06-05 | End: 2024-06-09

## 2024-06-05 RX ORDER — ATORVASTATIN CALCIUM 80 MG/1
40 TABLET, FILM COATED ORAL AT BEDTIME
Refills: 0 | Status: DISCONTINUED | OUTPATIENT
Start: 2024-06-05 | End: 2024-06-09

## 2024-06-05 RX ORDER — ASPIRIN/CALCIUM CARB/MAGNESIUM 324 MG
81 TABLET ORAL DAILY
Refills: 0 | Status: DISCONTINUED | OUTPATIENT
Start: 2024-06-05 | End: 2024-06-09

## 2024-06-05 RX ORDER — GABAPENTIN 400 MG/1
1 CAPSULE ORAL
Refills: 0 | DISCHARGE

## 2024-06-05 RX ORDER — ALBUTEROL 90 UG/1
2 AEROSOL, METERED ORAL
Qty: 0 | Refills: 0 | DISCHARGE

## 2024-06-05 RX ORDER — FUROSEMIDE 40 MG
1 TABLET ORAL
Refills: 0 | DISCHARGE

## 2024-06-05 RX ORDER — WARFARIN SODIUM 2.5 MG/1
10 TABLET ORAL ONCE
Refills: 0 | Status: COMPLETED | OUTPATIENT
Start: 2024-06-05 | End: 2024-06-05

## 2024-06-05 RX ORDER — LISINOPRIL 2.5 MG/1
40 TABLET ORAL DAILY
Refills: 0 | Status: DISCONTINUED | OUTPATIENT
Start: 2024-06-05 | End: 2024-06-09

## 2024-06-05 RX ORDER — OXYCODONE HYDROCHLORIDE 5 MG/1
10 TABLET ORAL EVERY 6 HOURS
Refills: 0 | Status: DISCONTINUED | OUTPATIENT
Start: 2024-06-05 | End: 2024-06-06

## 2024-06-05 RX ORDER — DIGOXIN 250 MCG
1 TABLET ORAL
Refills: 0 | DISCHARGE

## 2024-06-05 RX ORDER — PYRIDOSTIGMINE BROMIDE 60 MG/5ML
2 SOLUTION ORAL
Qty: 0 | Refills: 0 | DISCHARGE

## 2024-06-05 RX ORDER — FUROSEMIDE 40 MG
40 TABLET ORAL DAILY
Refills: 0 | Status: DISCONTINUED | OUTPATIENT
Start: 2024-06-05 | End: 2024-06-09

## 2024-06-05 RX ORDER — ACETAMINOPHEN 500 MG
650 TABLET ORAL EVERY 6 HOURS
Refills: 0 | Status: DISCONTINUED | OUTPATIENT
Start: 2024-06-05 | End: 2024-06-06

## 2024-06-05 RX ORDER — ALBUTEROL 90 UG/1
2 AEROSOL, METERED ORAL EVERY 6 HOURS
Refills: 0 | Status: DISCONTINUED | OUTPATIENT
Start: 2024-06-05 | End: 2024-06-09

## 2024-06-05 RX ORDER — PYRIDOSTIGMINE BROMIDE 60 MG/5ML
120 SOLUTION ORAL THREE TIMES A DAY
Refills: 0 | Status: DISCONTINUED | OUTPATIENT
Start: 2024-06-05 | End: 2024-06-06

## 2024-06-05 RX ORDER — RAMIPRIL 5 MG
0 CAPSULE ORAL
Refills: 0 | DISCHARGE

## 2024-06-05 RX ORDER — PYRIDOSTIGMINE BROMIDE 60 MG/5ML
1 SOLUTION ORAL
Qty: 0 | Refills: 0 | DISCHARGE

## 2024-06-05 RX ADMIN — ATORVASTATIN CALCIUM 40 MILLIGRAM(S): 80 TABLET, FILM COATED ORAL at 22:30

## 2024-06-05 RX ADMIN — WARFARIN SODIUM 10 MILLIGRAM(S): 2.5 TABLET ORAL at 22:34

## 2024-06-05 RX ADMIN — OXYCODONE HYDROCHLORIDE 10 MILLIGRAM(S): 5 TABLET ORAL at 06:57

## 2024-06-05 RX ADMIN — Medication 81 MILLIGRAM(S): at 09:40

## 2024-06-05 RX ADMIN — OXYCODONE HYDROCHLORIDE 10 MILLIGRAM(S): 5 TABLET ORAL at 13:32

## 2024-06-05 RX ADMIN — LISINOPRIL 40 MILLIGRAM(S): 2.5 TABLET ORAL at 09:40

## 2024-06-05 RX ADMIN — Medication 250 MICROGRAM(S): at 09:40

## 2024-06-05 RX ADMIN — Medication 650 MILLIGRAM(S): at 06:58

## 2024-06-05 RX ADMIN — PYRIDOSTIGMINE BROMIDE 120 MILLIGRAM(S): 60 SOLUTION ORAL at 06:58

## 2024-06-05 RX ADMIN — AZATHIOPRINE 50 MILLIGRAM(S): 100 TABLET ORAL at 13:10

## 2024-06-05 RX ADMIN — LINEZOLID 300 MILLIGRAM(S): 600 INJECTION, SOLUTION INTRAVENOUS at 22:32

## 2024-06-05 RX ADMIN — Medication 650 MILLIGRAM(S): at 17:52

## 2024-06-05 RX ADMIN — OXYCODONE HYDROCHLORIDE 10 MILLIGRAM(S): 5 TABLET ORAL at 22:51

## 2024-06-05 RX ADMIN — PYRIDOSTIGMINE BROMIDE 120 MILLIGRAM(S): 60 SOLUTION ORAL at 22:32

## 2024-06-05 RX ADMIN — GABAPENTIN 300 MILLIGRAM(S): 400 CAPSULE ORAL at 09:40

## 2024-06-05 RX ADMIN — AZATHIOPRINE 50 MILLIGRAM(S): 100 TABLET ORAL at 22:30

## 2024-06-05 RX ADMIN — GABAPENTIN 300 MILLIGRAM(S): 400 CAPSULE ORAL at 22:31

## 2024-06-05 RX ADMIN — PYRIDOSTIGMINE BROMIDE 120 MILLIGRAM(S): 60 SOLUTION ORAL at 13:09

## 2024-06-05 RX ADMIN — OXYCODONE HYDROCHLORIDE 10 MILLIGRAM(S): 5 TABLET ORAL at 14:00

## 2024-06-05 RX ADMIN — LINEZOLID 300 MILLIGRAM(S): 600 INJECTION, SOLUTION INTRAVENOUS at 09:41

## 2024-06-05 RX ADMIN — AZATHIOPRINE 50 MILLIGRAM(S): 100 TABLET ORAL at 09:41

## 2024-06-05 RX ADMIN — OXYCODONE HYDROCHLORIDE 10 MILLIGRAM(S): 5 TABLET ORAL at 23:45

## 2024-06-05 NOTE — H&P ADULT - ASSESSMENT
62 y/o M w/ PMH permanent A-fib, myasthenia gravis, HTN, BUDDY (non-adherent to CPAP), asthma, NSTEMI, p/w R foot ulcer    *R foot ulcer  -Wound culture likely to demonstrate colonizing bacteria. Patient was given linzolid in ED (patient is allergic to vanco), will consult ID  -F/u blood cx   -ESR / CRP   -Podiatry consult     *H/o permanent A-fib   -On coumadin    *Myasthenia gravis / HTN / BUDDY / asthma   -C/w home meds and f/u outpatient for further management if conditions remain stable during hospitalization     *DVT ppx   -On Coumadin     >75 mins required for admission

## 2024-06-05 NOTE — CONSULT NOTE ADULT - ASSESSMENT
62 y/o M w/ PMH permanent A-fib, myasthenia gravis, HTN, BUDDY (non-adherent to CPAP), asthma, NSTEMI, p/w R foot ulcer. Patient had wound culture done outpatient by his PCP and was sent to ED for IV antibiotics. Patient states he has been following up with Dr. Castanon for ulcer. Patient was augmentin and doxycycline outpatient. Patient denies fever / chills. 62 y/o M w/ PMH permanent A-fib, myasthenia gravis, HTN, BUDDY (non-adherent to CPAP), asthma, NSTEMI, p/w R foot ulcer. Patient had wound culture done outpatient by his PCP and was sent to ED for IV antibiotics. Patient states he has been following up with Dr. Castanon for ulcer. Patient was augmentin and doxycycline outpatient. Patient denies fever / chills.    1. Bilateral LE wounds/superimposed cellulitis. Stasis dermatitis. PVD  - imaging reviewed  - podiatry eval noted  - f/u outpt wound cx  - on linezolid   - continue with abx coverage  - wound care  - fu cbc  - f/u cultures sent here  - tolerating abx well so far; no side effects noted  - reason for abx use and side effects reviewed with patient  - supportive care     Clinical team may change from intravenous to oral antibiotics when the following criteria are met:   1. Patient is clinically improving/stable       a)	Improved signs and symptoms of infection from initial presentation       b)	Afebrile for 24 hours       c)	Leukocytosis trending towards normal range   2. Patient is tolerating oral intake   3. Initial/repeat blood cultures are negative    Cannot advise changing to oral antibiotic therapy until culture sensitivity is available.

## 2024-06-05 NOTE — H&P ADULT - CONVERSATION DETAILS
States his wife Virginia is HCP, and he defers resuscitation status to her.
Food - and nutrition - related knowledge deficit

## 2024-06-05 NOTE — ED ADULT NURSE REASSESSMENT NOTE - NS ED NURSE REASSESS COMMENT FT1
rec'd report on patient from previous RN.  patient is A&Ox3 resting comfortably, denies pain.  patient aware he waiting in patient bed.

## 2024-06-05 NOTE — CONSULT NOTE ADULT - ASSESSMENT
A: 63yr old male seen for the following:   - Bilateral LE partial thickness wounds; chronic     P:   Chart reviewed and Patient evaluated;  Discussed diagnosis and treatment with patient. Discussed importance of daily foot examinations, proper shoe gear, importance of tight glycemic control.   X-rays reviewed : on wet read showing no soft tissue emphysema  Wound flush with normal saline  Applied kimi to the wound on the Left LE carola leg and santyl on the left foot wound followed by unna boot to the left LE; Applied santyl DSD to the Right foot  WBAT to gerardo feet  Offloading to bilateral heels while bedbound.   Continue with antibiotics as per ID  All additional care per Med appreciated  Patient demonstrated verbal understanding of all interventions and tolerated interventions well without any complications.   Podiatry will follow while in house

## 2024-06-05 NOTE — PATIENT PROFILE ADULT - FALL HARM RISK - HARM RISK INTERVENTIONS

## 2024-06-05 NOTE — H&P ADULT - HISTORY OF PRESENT ILLNESS
64 y/o M w/ PMH permanent A-fib, myasthenia gravis, HTN, BUDDY (non-adherent to CPAP), asthma, NSTEMI, p/w R foot ulcer. Patient had wound culture done outpatient by his PCP and was sent to ED for IV antibiotics. Patient states he has been following up with Dr. Castanon for ulcer. Patient was augmentin and doxycycline outpatient. Patient denies fever / chills.       PSH: Denies     Social Hx: Denies tobacco / etoh  /drugs     Family Hx: Father - lung cancer

## 2024-06-05 NOTE — CONSULT NOTE ADULT - SUBJECTIVE AND OBJECTIVE BOX
Patient is a 63y old  Male who presents with a chief complaint of R foot ulcer (05 Jun 2024 10:20)    HPI:  62 y/o M w/ PMH permanent A-fib, myasthenia gravis, HTN, BUDDY (non-adherent to CPAP), asthma, NSTEMI, p/w R foot ulcer. Patient had wound culture done outpatient by his PCP and was sent to ED for IV antibiotics. Patient states he has been following up with Dr. Castanon for ulcer. Patient was augmentin and doxycycline outpatient. Patient denies fever / chills.       PSH: Denies     Social Hx: Denies tobacco / etoh  /drugs     Family Hx: Father - lung cancer    (05 Jun 2024 03:47)      PMH: as above  PSH: as above  Meds: per reconciliation sheet, noted below  MEDICATIONS  (STANDING):  aspirin enteric coated 81 milliGRAM(s) Oral daily  atorvastatin 40 milliGRAM(s) Oral at bedtime  azaTHIOprine 50 milliGRAM(s) Oral <User Schedule>  digoxin     Tablet 250 MICROGram(s) Oral daily  gabapentin 300 milliGRAM(s) Oral two times a day  linezolid  IVPB 600 milliGRAM(s) IV Intermittent every 12 hours  lisinopril 40 milliGRAM(s) Oral daily  pyridostigmine 120 milliGRAM(s) Oral three times a day  warfarin 10 milliGRAM(s) Oral once      Allergies    vancomycin (Unknown)  sulfamethoxazole-trimethoprim (Flushing; Urticaria; Rash)  Keflex (Flushing; Urticaria; Rash)    Intolerances      Social: no smoking, no alcohol, no illegal drugs; no recent travel, no exposure to TB  FAMILY HISTORY:     no history of premature cardiovascular disease in first degree relatives    ROS: the patient denies fever, no chills, no HA, no dizziness, no sore throat, no blurry vision, no CP, no palpitations, no SOB, no cough, no abdominal pain, no diarrhea, no N/V, no dysuria, no leg pain, no claudication, no rash, no joint aches, no rectal pain or bleeding, no night sweats    All other systems reviewed and are negative    Vital Signs Last 24 Hrs  T(C): 36.6 (05 Jun 2024 06:55), Max: 37.2 (04 Jun 2024 17:51)  T(F): 97.9 (05 Jun 2024 06:55), Max: 99 (04 Jun 2024 17:51)  HR: 68 (05 Jun 2024 09:50) (67 - 89)  BP: 169/86 (05 Jun 2024 09:50) (131/82 - 169/86)  BP(mean): --  RR: 18 (05 Jun 2024 06:55) (18 - 18)  SpO2: 99% (05 Jun 2024 06:55) (97% - 99%)    Parameters below as of 05 Jun 2024 06:55  Patient On (Oxygen Delivery Method): room air      Daily Height in cm: 193.04 (04 Jun 2024 17:55)    Daily     PE:    Constitutional: frail looking  HEENT: NC/AT, EOMI, PERRLA, conjunctivae clear; ears and nose atraumatic; pharynx benign  Neck: supple; thyroid not palpable  Back: no tenderness  Respiratory: respiratory effort normal; clear to auscultation  Cardiovascular: S1S2 regular, no murmurs  Abdomen: soft, not tender, not distended, positive BS; liver and spleen WNL  Genitourinary: no suprapubic tenderness  Lymphatic: no LN palpable  Musculoskeletal: no muscle tenderness, no joint swelling or tenderness  Extremities: no pedal edema  Neurological/ Psychiatric:   moving all extremities  Skin: no rashes; no palpable lesions bilateral foot ulcers     Labs: all available labs reviewed                        13.3   7.43  )-----------( 324      ( 05 Jun 2024 06:42 )             42.2     06-05    138  |  109<H>  |  17  ----------------------------<  88  4.1   |  27  |  0.86    Ca    8.5      05 Jun 2024 06:42    TPro  6.3  /  Alb  3.0<L>  /  TBili  0.7  /  DBili  x   /  AST  23  /  ALT  27  /  AlkPhos  50  06-05     LIVER FUNCTIONS - ( 05 Jun 2024 06:42 )  Alb: 3.0 g/dL / Pro: 6.3 gm/dL / ALK PHOS: 50 U/L / ALT: 27 U/L / AST: 23 U/L / GGT: x           Urinalysis Basic - ( 05 Jun 2024 06:42 )    Color: x / Appearance: x / SG: x / pH: x  Gluc: 88 mg/dL / Ketone: x  / Bili: x / Urobili: x   Blood: x / Protein: x / Nitrite: x   Leuk Esterase: x / RBC: x / WBC x   Sq Epi: x / Non Sq Epi: x / Bacteria: x          Radiology: all available radiological tests reviewed    Advanced directives addressed: full resuscitation Patient is a 63y old  Male who presents with a chief complaint of R foot ulcer (05 Jun 2024 10:20)    HPI:  64 y/o M w/ PMH permanent A-fib, myasthenia gravis, HTN, BUDDY (non-adherent to CPAP), asthma, NSTEMI, p/w R foot ulcer. Patient had wound culture done outpatient by his PCP and was sent to ED for IV antibiotics. Patient states he has been following up with Dr. Castanon for ulcer. Patient was augmentin and doxycycline outpatient. Patient denies fever / chills.       PSH: Denies     Social Hx: Denies tobacco / etoh  /drugs     Family Hx: Father - lung cancer    (05 Jun 2024 03:47)      PMH: as above  PSH: as above  Meds: per reconciliation sheet, noted below  MEDICATIONS  (STANDING):  aspirin enteric coated 81 milliGRAM(s) Oral daily  atorvastatin 40 milliGRAM(s) Oral at bedtime  azaTHIOprine 50 milliGRAM(s) Oral <User Schedule>  digoxin     Tablet 250 MICROGram(s) Oral daily  gabapentin 300 milliGRAM(s) Oral two times a day  linezolid  IVPB 600 milliGRAM(s) IV Intermittent every 12 hours  lisinopril 40 milliGRAM(s) Oral daily  pyridostigmine 120 milliGRAM(s) Oral three times a day  warfarin 10 milliGRAM(s) Oral once      Allergies    vancomycin (Unknown)  sulfamethoxazole-trimethoprim (Flushing; Urticaria; Rash)  Keflex (Flushing; Urticaria; Rash)    Intolerances      Social: no smoking, no alcohol, no illegal drugs; no recent travel, no exposure to TB  FAMILY HISTORY:     no history of premature cardiovascular disease in first degree relatives    ROS: the patient denies fever, no chills, no HA, no dizziness, no sore throat, no blurry vision, no CP, no palpitations, no SOB, no cough, no abdominal pain, no diarrhea, no N/V, no dysuria, no leg pain, no claudication, no rash, no joint aches, no rectal pain or bleeding, no night sweats    All other systems reviewed and are negative    Vital Signs Last 24 Hrs  T(C): 36.6 (05 Jun 2024 06:55), Max: 37.2 (04 Jun 2024 17:51)  T(F): 97.9 (05 Jun 2024 06:55), Max: 99 (04 Jun 2024 17:51)  HR: 68 (05 Jun 2024 09:50) (67 - 89)  BP: 169/86 (05 Jun 2024 09:50) (131/82 - 169/86)  BP(mean): --  RR: 18 (05 Jun 2024 06:55) (18 - 18)  SpO2: 99% (05 Jun 2024 06:55) (97% - 99%)    Parameters below as of 05 Jun 2024 06:55  Patient On (Oxygen Delivery Method): room air      Daily Height in cm: 193.04 (04 Jun 2024 17:55)    Daily     PE:    Constitutional: NAD  HEENT: NC/AT, EOMI, PERRLA, conjunctivae clear; ears and nose atraumatic; pharynx benign  Neck: supple; thyroid not palpable  Back: no tenderness  Respiratory: respiratory effort normal; clear to auscultation  Cardiovascular: S1S2 regular, no murmurs  Abdomen: soft, not tender, not distended, positive BS; liver and spleen WNL  Genitourinary: no suprapubic tenderness  Lymphatic: no LN palpable  Musculoskeletal: no muscle tenderness, no joint swelling or tenderness  Extremities: no pedal edema bilateral LE wounds skin peeling, ulcers   Neurological/ Psychiatric:   moving all extremities  Skin: no rashes; no palpable lesions bilateral foot ulcers     Labs: all available labs reviewed                        13.3   7.43  )-----------( 324      ( 05 Jun 2024 06:42 )             42.2     06-05    138  |  109<H>  |  17  ----------------------------<  88  4.1   |  27  |  0.86    Ca    8.5      05 Jun 2024 06:42    TPro  6.3  /  Alb  3.0<L>  /  TBili  0.7  /  DBili  x   /  AST  23  /  ALT  27  /  AlkPhos  50  06-05     LIVER FUNCTIONS - ( 05 Jun 2024 06:42 )  Alb: 3.0 g/dL / Pro: 6.3 gm/dL / ALK PHOS: 50 U/L / ALT: 27 U/L / AST: 23 U/L / GGT: x           Urinalysis Basic - ( 05 Jun 2024 06:42 )    Color: x / Appearance: x / SG: x / pH: x  Gluc: 88 mg/dL / Ketone: x  / Bili: x / Urobili: x   Blood: x / Protein: x / Nitrite: x   Leuk Esterase: x / RBC: x / WBC x   Sq Epi: x / Non Sq Epi: x / Bacteria: x          Radiology: all available radiological tests reviewed    Advanced directives addressed: full resuscitation

## 2024-06-05 NOTE — PROGRESS NOTE ADULT - ASSESSMENT
64 y/o M w/ PMH permanent A-fib, myasthenia gravis, HTN, BUDDY (non-adherent to CPAP), asthma, NSTEMI, p/w R foot ulcer. Patient had wound culture done outpatient by his PCP and was sent to ED for IV antibiotics. Patient states he has been following up with Dr. Castanon for ulcer. Patient was augmentin and doxycycline outpatient. Patient denies fever / chills.     # Bilateral foot ulcers  - Continue linezolid  - Patient has allergy to keflex and vanco  - ID consult  - F/u blood cx   -ESR / CRP   -Podiatry consult     # H/o permanent A-fib   -On coumadin  - Continue digoxin    # Myasthenia gravis  - Continue mestinon     # HTN  - Stable  - Continue lisinopril  - Continue lasix    # Chronic pain  - Continue oxycodone  - continue gabapentin    # HLD  - Atorvastatin    # DVT ppx   -On Coumadin

## 2024-06-05 NOTE — CONSULT NOTE ADULT - SUBJECTIVE AND OBJECTIVE BOX
Date of consult: 6/5/2024    Chief Complaint : Patient is a 63y old  Male who presents with a chief complaint of R foot ulcer (05 Jun 2024 13:06)    HPI :   64 y/o M w/ PMH permanent A-fib, myasthenia gravis, HTN, BUDDY (non-adherent to CPAP), asthma, NSTEMI, p/w R foot ulcer. Pt history of gerardo foot wounds which are chronic and managed by local wound care;  Pt follows up at the [facility] wound care center. Was last seen on Friday, per pt he saw his PCP on Monday and was told to go for IV antibiotics by his PCP after getting the culture report; per pt the culture reports were given upon admission. States he has gerardo leg swelling which has gone down last few weeks. Denies f/c/n/v/sob.    PMH: No pertinent past medical history    Afib    Myasthenia gravis    PSH:No significant past surgical history    Allergies:vancomycin (Unknown)  sulfamethoxazole-trimethoprim (Flushing; Urticaria; Rash)  Keflex (Flushing; Urticaria; Rash)      Labs:              13.3   7.43  )-----------( 324      ( 05 Jun 2024 06:42 )             42.2     WBC Trend  7.43 Date (06-05 @ 06:42)  8.14 Date (06-04 @ 20:25)  9.68 Date (05-27 @ 11:24)      Chem  06-05    138  |  109<H>  |  17  ----------------------------<  88  4.1   |  27  |  0.86    Ca    8.5      05 Jun 2024 06:42    TPro  6.3  /  Alb  3.0<L>  /  TBili  0.7  /  DBili  x   /  AST  23  /  ALT  27  /  AlkPhos  50  06-05    Vital Signs Last 24 Hrs  T(C): 36.5 (05 Jun 2024 15:51), Max: 37.2 (04 Jun 2024 17:51)  T(F): 97.7 (05 Jun 2024 15:51), Max: 99 (04 Jun 2024 17:51)  HR: 83 (05 Jun 2024 15:51) (67 - 89)  BP: 136/89 (05 Jun 2024 15:51) (131/82 - 169/86)  BP(mean): --  RR: 18 (05 Jun 2024 15:51) (18 - 18)  SpO2: 95% (05 Jun 2024 15:51) (95% - 99%)    Parameters below as of 05 Jun 2024 15:51  Patient On (Oxygen Delivery Method): room air      REVIEW OF SYSTEMS:    CONSTITUTIONAL: No weakness, fevers or chills  EYES: No visual changes  RESPIRATORY: No cough, wheezing; No shortness of breath  CARDIOVASCULAR: No chest pain or palpitations  GASTROINTESTINAL: No abdominal or epigastric pain. No nausea, vomiting; No diarrhea or constipation.   GENITOURINARY: No dysuria, frequency or hematuria  NEUROLOGICAL: No numbness or weakness  SKIN: See physical examination.  All other review of systems is negative unless indicated above    Physical Exam:   Constitutional: NAD, alert;  Lower Extremity Focus  Derm:  Skin warm, dry and supple bilateral.    Partial thickness ulceration noted to the right foot dorsal aspect of the forefoot covered with eschar tissue, no periwound edema or erythema noted, no discharge noted. Wound size aprox 9cm x 3cm  Left foot: Partial thickness ulceration noted to the medial aspect of the mid leg with some scant serous drainage, wound size aprox 2.5cm x 1.5 cm, periwound edema or erythema noted. Partial thickness wound noted to the dorsum of the left foot 1.5cm x 2cm, no drainage noted, no periwound edema or erythema noted, no malodor noted.   Diffuse +2cm pitting edema noted to the gerardo LE.   Vascular: Dorsalis Pedis 2/4 Posterior Tibial pulses 1/4; Capillary re-fill time less then 3 seconds digits 1-5 bilateral.    Neuro: Protective sensation intact to the level of the digits bilateral.  MSK: Muscle strength 5/5 all major muscle groups bilateral.     < from: Xray Foot AP + Lateral + Oblique, Right (06.04.24 @ 20:27) >    ACC: [id number] EXAM:  XR FOOT COMP MIN 3 VIEWS RT   ORDERED BY: CARYN BLACKMON     PROCEDURE DATE:  06/04/2024          INTERPRETATION:  Right foot    HISTORY: Foot infection    COMPARISON: 5/27/2024     3 views of the right foot show partial healing ofthe fifth metatarsal   fracture. Bipartite sesamoid bones of the first metatarsal are again   noted. The joint spaces are maintained.    IMPRESSION: Healing fracture.      Thank you for this referral.    --- End of Report ---    < end of copied text >

## 2024-06-06 LAB
ALBUMIN SERPL ELPH-MCNC: 2.9 G/DL — LOW (ref 3.3–5)
ALP SERPL-CCNC: 50 U/L — SIGNIFICANT CHANGE UP (ref 40–120)
ALT FLD-CCNC: 23 U/L — SIGNIFICANT CHANGE UP (ref 12–78)
ANION GAP SERPL CALC-SCNC: 1 MMOL/L — LOW (ref 5–17)
AST SERPL-CCNC: 19 U/L — SIGNIFICANT CHANGE UP (ref 15–37)
BILIRUB SERPL-MCNC: 0.7 MG/DL — SIGNIFICANT CHANGE UP (ref 0.2–1.2)
BUN SERPL-MCNC: 11 MG/DL — SIGNIFICANT CHANGE UP (ref 7–23)
CALCIUM SERPL-MCNC: 9 MG/DL — SIGNIFICANT CHANGE UP (ref 8.5–10.1)
CHLORIDE SERPL-SCNC: 111 MMOL/L — HIGH (ref 96–108)
CO2 SERPL-SCNC: 28 MMOL/L — SIGNIFICANT CHANGE UP (ref 22–31)
CREAT SERPL-MCNC: 0.84 MG/DL — SIGNIFICANT CHANGE UP (ref 0.5–1.3)
EGFR: 98 ML/MIN/1.73M2 — SIGNIFICANT CHANGE UP
GLUCOSE SERPL-MCNC: 95 MG/DL — SIGNIFICANT CHANGE UP (ref 70–99)
HCT VFR BLD CALC: 43.3 % — SIGNIFICANT CHANGE UP (ref 39–50)
HGB BLD-MCNC: 13.7 G/DL — SIGNIFICANT CHANGE UP (ref 13–17)
INR BLD: 1.7 RATIO — HIGH (ref 0.85–1.18)
MCHC RBC-ENTMCNC: 29.7 PG — SIGNIFICANT CHANGE UP (ref 27–34)
MCHC RBC-ENTMCNC: 31.6 GM/DL — LOW (ref 32–36)
MCV RBC AUTO: 93.7 FL — SIGNIFICANT CHANGE UP (ref 80–100)
PLATELET # BLD AUTO: 345 K/UL — SIGNIFICANT CHANGE UP (ref 150–400)
POTASSIUM SERPL-MCNC: 4.3 MMOL/L — SIGNIFICANT CHANGE UP (ref 3.5–5.3)
POTASSIUM SERPL-SCNC: 4.3 MMOL/L — SIGNIFICANT CHANGE UP (ref 3.5–5.3)
PROT SERPL-MCNC: 6.2 GM/DL — SIGNIFICANT CHANGE UP (ref 6–8.3)
PROTHROM AB SERPL-ACNC: 18.9 SEC — HIGH (ref 9.5–13)
RBC # BLD: 4.62 M/UL — SIGNIFICANT CHANGE UP (ref 4.2–5.8)
RBC # FLD: 13.6 % — SIGNIFICANT CHANGE UP (ref 10.3–14.5)
SODIUM SERPL-SCNC: 140 MMOL/L — SIGNIFICANT CHANGE UP (ref 135–145)
WBC # BLD: 8.59 K/UL — SIGNIFICANT CHANGE UP (ref 3.8–10.5)
WBC # FLD AUTO: 8.59 K/UL — SIGNIFICANT CHANGE UP (ref 3.8–10.5)

## 2024-06-06 PROCEDURE — 99233 SBSQ HOSP IP/OBS HIGH 50: CPT

## 2024-06-06 RX ORDER — PYRIDOSTIGMINE BROMIDE 60 MG/5ML
60 SOLUTION ORAL
Refills: 0 | Status: DISCONTINUED | OUTPATIENT
Start: 2024-06-06 | End: 2024-06-09

## 2024-06-06 RX ORDER — WARFARIN SODIUM 2.5 MG/1
10 TABLET ORAL ONCE
Refills: 0 | Status: COMPLETED | OUTPATIENT
Start: 2024-06-06 | End: 2024-06-06

## 2024-06-06 RX ORDER — PYRIDOSTIGMINE BROMIDE 60 MG/5ML
120 SOLUTION ORAL
Refills: 0 | Status: DISCONTINUED | OUTPATIENT
Start: 2024-06-06 | End: 2024-06-09

## 2024-06-06 RX ORDER — PYRIDOSTIGMINE BROMIDE 60 MG/5ML
120 SOLUTION ORAL ONCE
Refills: 0 | Status: COMPLETED | OUTPATIENT
Start: 2024-06-06 | End: 2024-06-06

## 2024-06-06 RX ORDER — NALOXONE HYDROCHLORIDE 4 MG/.1ML
0.2 SPRAY NASAL
Refills: 0 | Status: DISCONTINUED | OUTPATIENT
Start: 2024-06-06 | End: 2024-06-09

## 2024-06-06 RX ADMIN — PYRIDOSTIGMINE BROMIDE 120 MILLIGRAM(S): 60 SOLUTION ORAL at 11:45

## 2024-06-06 RX ADMIN — Medication 650 MILLIGRAM(S): at 10:11

## 2024-06-06 RX ADMIN — AZATHIOPRINE 50 MILLIGRAM(S): 100 TABLET ORAL at 10:10

## 2024-06-06 RX ADMIN — LINEZOLID 300 MILLIGRAM(S): 600 INJECTION, SOLUTION INTRAVENOUS at 21:14

## 2024-06-06 RX ADMIN — PYRIDOSTIGMINE BROMIDE 120 MILLIGRAM(S): 60 SOLUTION ORAL at 13:04

## 2024-06-06 RX ADMIN — AZATHIOPRINE 50 MILLIGRAM(S): 100 TABLET ORAL at 20:25

## 2024-06-06 RX ADMIN — Medication 81 MILLIGRAM(S): at 10:10

## 2024-06-06 RX ADMIN — PYRIDOSTIGMINE BROMIDE 120 MILLIGRAM(S): 60 SOLUTION ORAL at 05:54

## 2024-06-06 RX ADMIN — Medication 250 MICROGRAM(S): at 10:11

## 2024-06-06 RX ADMIN — WARFARIN SODIUM 10 MILLIGRAM(S): 2.5 TABLET ORAL at 21:13

## 2024-06-06 RX ADMIN — ATORVASTATIN CALCIUM 40 MILLIGRAM(S): 80 TABLET, FILM COATED ORAL at 21:13

## 2024-06-06 RX ADMIN — PYRIDOSTIGMINE BROMIDE 60 MILLIGRAM(S): 60 SOLUTION ORAL at 20:25

## 2024-06-06 RX ADMIN — GABAPENTIN 300 MILLIGRAM(S): 400 CAPSULE ORAL at 21:13

## 2024-06-06 RX ADMIN — LISINOPRIL 40 MILLIGRAM(S): 2.5 TABLET ORAL at 10:10

## 2024-06-06 RX ADMIN — AZATHIOPRINE 50 MILLIGRAM(S): 100 TABLET ORAL at 14:48

## 2024-06-06 RX ADMIN — GABAPENTIN 300 MILLIGRAM(S): 400 CAPSULE ORAL at 10:10

## 2024-06-06 RX ADMIN — OXYCODONE HYDROCHLORIDE 10 MILLIGRAM(S): 5 TABLET ORAL at 05:56

## 2024-06-06 RX ADMIN — LINEZOLID 300 MILLIGRAM(S): 600 INJECTION, SOLUTION INTRAVENOUS at 10:13

## 2024-06-06 NOTE — PROGRESS NOTE ADULT - ASSESSMENT
A: 63yr old male seen for the following:   - Bilateral LE partial thickness wounds; chronic     P:   Chart reviewed and Patient evaluated;  Discussed diagnosis and treatment with patient. Discussed importance of daily foot examinations, proper shoe gear, importance of tight glycemic control.   X-rays reviewed : on wet read showing no soft tissue emphysema  Wound flush with normal saline  Applied kimi to the wound on the Left LE carola leg and santyl on the left foot wound followed by unna boot to the left LE on 6/5/2024, next change 6/12/2024;   Applied santyl DSD to the Right foot  WBAT to gerardo feet  Offloading to bilateral heels while bedbound.   Continue with antibiotics as per ID  All additional care per Med appreciated  Patient demonstrated verbal understanding of all interventions and tolerated interventions well without any complications.   Podiatry will follow while in house

## 2024-06-06 NOTE — PROGRESS NOTE ADULT - ASSESSMENT
63M w/ permanent A-fib, myasthenia gravis, HTN, BUDDY (non-adherent to CPAP), asthma, NSTEMI, p/w R foot ulcer. Patient had wound culture done outpatient by his PCP and was sent to ED for IV antibiotics.    # Bilateral foot ulcers  - Continue linezolid  - ID consult reviewed  - F/u blood cx   -Podiatry consult reviewed    # H/o permanent A-fib   -On coumadin, digoxin    # Myasthenia gravis- Continue mestinon     # HTN- Continue lisinopril, lasix    # Chronic pain  - Continue oxycodone, gabapentin    # HLD  - Atorvastatin    DVT proph:  Coumadin   Code Status: FULL code 	  dispo: discharge pending improvement in clinical status    Outpatient follow-up:   with PMD  with vascular surg  with podiatry

## 2024-06-07 LAB
BUN SERPL-MCNC: 11 MG/DL — SIGNIFICANT CHANGE UP (ref 7–23)
CALCIUM SERPL-MCNC: 8.5 MG/DL — SIGNIFICANT CHANGE UP (ref 8.5–10.1)
CHLORIDE SERPL-SCNC: 108 MMOL/L — SIGNIFICANT CHANGE UP (ref 96–108)
CO2 SERPL-SCNC: 29 MMOL/L — SIGNIFICANT CHANGE UP (ref 22–31)
CREAT SERPL-MCNC: 0.86 MG/DL — SIGNIFICANT CHANGE UP (ref 0.5–1.3)
EGFR: 97 ML/MIN/1.73M2 — SIGNIFICANT CHANGE UP
GLUCOSE SERPL-MCNC: 87 MG/DL — SIGNIFICANT CHANGE UP (ref 70–99)
HCT VFR BLD CALC: 41.6 % — SIGNIFICANT CHANGE UP (ref 39–50)
HGB BLD-MCNC: 13.1 G/DL — SIGNIFICANT CHANGE UP (ref 13–17)
INR BLD: 1.82 RATIO — HIGH (ref 0.85–1.18)
MCHC RBC-ENTMCNC: 29.2 PG — SIGNIFICANT CHANGE UP (ref 27–34)
MCHC RBC-ENTMCNC: 31.5 GM/DL — LOW (ref 32–36)
MCV RBC AUTO: 92.9 FL — SIGNIFICANT CHANGE UP (ref 80–100)
PLATELET # BLD AUTO: 326 K/UL — SIGNIFICANT CHANGE UP (ref 150–400)
POTASSIUM SERPL-MCNC: 4.2 MMOL/L — SIGNIFICANT CHANGE UP (ref 3.5–5.3)
POTASSIUM SERPL-SCNC: 4.2 MMOL/L — SIGNIFICANT CHANGE UP (ref 3.5–5.3)
PROTHROM AB SERPL-ACNC: 20.2 SEC — HIGH (ref 9.5–13)
RBC # BLD: 4.48 M/UL — SIGNIFICANT CHANGE UP (ref 4.2–5.8)
RBC # FLD: 13.5 % — SIGNIFICANT CHANGE UP (ref 10.3–14.5)
SODIUM SERPL-SCNC: 136 MMOL/L — SIGNIFICANT CHANGE UP (ref 135–145)
WBC # BLD: 8.29 K/UL — SIGNIFICANT CHANGE UP (ref 3.8–10.5)
WBC # FLD AUTO: 8.29 K/UL — SIGNIFICANT CHANGE UP (ref 3.8–10.5)

## 2024-06-07 PROCEDURE — 99232 SBSQ HOSP IP/OBS MODERATE 35: CPT

## 2024-06-07 RX ORDER — LINEZOLID 600 MG/300ML
600 INJECTION, SOLUTION INTRAVENOUS EVERY 12 HOURS
Refills: 0 | Status: DISCONTINUED | OUTPATIENT
Start: 2024-06-07 | End: 2024-06-09

## 2024-06-07 RX ORDER — WARFARIN SODIUM 2.5 MG/1
10 TABLET ORAL ONCE
Refills: 0 | Status: COMPLETED | OUTPATIENT
Start: 2024-06-07 | End: 2024-06-07

## 2024-06-07 RX ADMIN — LINEZOLID 600 MILLIGRAM(S): 600 INJECTION, SOLUTION INTRAVENOUS at 21:09

## 2024-06-07 RX ADMIN — GABAPENTIN 300 MILLIGRAM(S): 400 CAPSULE ORAL at 09:53

## 2024-06-07 RX ADMIN — PYRIDOSTIGMINE BROMIDE 120 MILLIGRAM(S): 60 SOLUTION ORAL at 07:45

## 2024-06-07 RX ADMIN — ATORVASTATIN CALCIUM 40 MILLIGRAM(S): 80 TABLET, FILM COATED ORAL at 21:08

## 2024-06-07 RX ADMIN — PYRIDOSTIGMINE BROMIDE 60 MILLIGRAM(S): 60 SOLUTION ORAL at 21:09

## 2024-06-07 RX ADMIN — LINEZOLID 300 MILLIGRAM(S): 600 INJECTION, SOLUTION INTRAVENOUS at 09:53

## 2024-06-07 RX ADMIN — Medication 81 MILLIGRAM(S): at 09:52

## 2024-06-07 RX ADMIN — Medication 250 MICROGRAM(S): at 09:53

## 2024-06-07 RX ADMIN — AZATHIOPRINE 50 MILLIGRAM(S): 100 TABLET ORAL at 07:45

## 2024-06-07 RX ADMIN — PYRIDOSTIGMINE BROMIDE 120 MILLIGRAM(S): 60 SOLUTION ORAL at 13:00

## 2024-06-07 RX ADMIN — GABAPENTIN 300 MILLIGRAM(S): 400 CAPSULE ORAL at 21:08

## 2024-06-07 RX ADMIN — AZATHIOPRINE 50 MILLIGRAM(S): 100 TABLET ORAL at 21:09

## 2024-06-07 RX ADMIN — LISINOPRIL 40 MILLIGRAM(S): 2.5 TABLET ORAL at 09:52

## 2024-06-07 RX ADMIN — AZATHIOPRINE 50 MILLIGRAM(S): 100 TABLET ORAL at 15:19

## 2024-06-07 RX ADMIN — WARFARIN SODIUM 10 MILLIGRAM(S): 2.5 TABLET ORAL at 21:08

## 2024-06-07 NOTE — PROGRESS NOTE ADULT - ASSESSMENT
63M w/ permanent A-fib, myasthenia gravis, HTN, BUDDY (non-adherent to CPAP), asthma, NSTEMI, p/w R foot ulcer. Patient had wound culture done outpatient by his PCP and was sent to ED for IV antibiotics.    # Bilateral foot ulcers  - Continue linezolid per id  - F/u blood cx   -Podiatry consult reviewed    # H/o permanent A-fib   -On coumadin, digoxin    # Myasthenia gravis- Continue mestinon     # HTN- Continue lisinopril, lasix    # Chronic pain  - Continue oxycodone, gabapentin    # HLD  - Atorvastatin    DVT proph:  Coumadin   Code Status: FULL code 	  dispo: discharge pending improvement in clinical status    Outpatient follow-up:   with PMD  with vascular surg  with podiatry

## 2024-06-07 NOTE — PROGRESS NOTE ADULT - ASSESSMENT
A: 63yr old male seen for the following:   - Bilateral LE partial thickness wounds; chronic     P:   Chart reviewed and Patient evaluated;  Discussed diagnosis and treatment with patient. Discussed importance of daily foot examinations, proper shoe gear, importance of tight glycemic control.   X-rays reviewed : on wet read showing no soft tissue emphysema  Wound flush with normal saline  WC left LE: kimi to the wound on the Left LE carola leg and santyl on the left foot wound followed by unna boot to the left LE on 6/5/2024, next change 6/12/2024;   WC right foot: santyl DSD to the Right foot  WBAT to gerardo feet  Offloading to bilateral heels while bedbound.   Continue with antibiotics as per ID  All additional care per Med appreciated  Patient demonstrated verbal understanding of all interventions and tolerated interventions well without any complications.   Podiatry will follow while in house     A: 63yr old male seen for the following:   - Bilateral LE partial thickness wounds; chronic     P:   Chart reviewed and Patient evaluated;  Discussed diagnosis and treatment with patient. Discussed importance of daily foot examinations, proper shoe gear, importance of tight glycemic control.   X-rays reviewed : on wet read showing no soft tissue emphysema  Wound flush with normal saline  WC left LE: kimi to the wound on the Left LE mid leg and santyl on the left foot wound followed by unna boot to the left LE on 6/5/2024, next change 6/12/2024;   WC right foot: santyl DSD to the Right foot  WBAT to gerardo feet  Offloading to bilateral heels while bedbound.   Continue with antibiotics as per ID  No podiatric surgical intervention warranted at this time. Pt stable from podiatric standpoint, Pt to f/u with Dr Castanon at the wound care center within a week after discharge once deemed stable by other specialties  All additional care per Med appreciated  Patient demonstrated verbal understanding of all interventions and tolerated interventions well without any complications.   Podiatry will follow while in house

## 2024-06-08 LAB
HCT VFR BLD CALC: 40.8 % — SIGNIFICANT CHANGE UP (ref 39–50)
HGB BLD-MCNC: 13 G/DL — SIGNIFICANT CHANGE UP (ref 13–17)
INR BLD: 2.29 RATIO — HIGH (ref 0.85–1.18)
MCHC RBC-ENTMCNC: 29.6 PG — SIGNIFICANT CHANGE UP (ref 27–34)
MCHC RBC-ENTMCNC: 31.9 GM/DL — LOW (ref 32–36)
MCV RBC AUTO: 92.9 FL — SIGNIFICANT CHANGE UP (ref 80–100)
PLATELET # BLD AUTO: 314 K/UL — SIGNIFICANT CHANGE UP (ref 150–400)
PROTHROM AB SERPL-ACNC: 25.3 SEC — HIGH (ref 9.5–13)
RBC # BLD: 4.39 M/UL — SIGNIFICANT CHANGE UP (ref 4.2–5.8)
RBC # FLD: 13.4 % — SIGNIFICANT CHANGE UP (ref 10.3–14.5)
WBC # BLD: 7.77 K/UL — SIGNIFICANT CHANGE UP (ref 3.8–10.5)
WBC # FLD AUTO: 7.77 K/UL — SIGNIFICANT CHANGE UP (ref 3.8–10.5)

## 2024-06-08 PROCEDURE — 99232 SBSQ HOSP IP/OBS MODERATE 35: CPT

## 2024-06-08 RX ORDER — WARFARIN SODIUM 2.5 MG/1
10 TABLET ORAL ONCE
Refills: 0 | Status: COMPLETED | OUTPATIENT
Start: 2024-06-08 | End: 2024-06-08

## 2024-06-08 RX ADMIN — Medication 81 MILLIGRAM(S): at 09:57

## 2024-06-08 RX ADMIN — AZATHIOPRINE 50 MILLIGRAM(S): 100 TABLET ORAL at 08:36

## 2024-06-08 RX ADMIN — WARFARIN SODIUM 10 MILLIGRAM(S): 2.5 TABLET ORAL at 21:33

## 2024-06-08 RX ADMIN — PYRIDOSTIGMINE BROMIDE 60 MILLIGRAM(S): 60 SOLUTION ORAL at 19:57

## 2024-06-08 RX ADMIN — AZATHIOPRINE 50 MILLIGRAM(S): 100 TABLET ORAL at 14:40

## 2024-06-08 RX ADMIN — LINEZOLID 600 MILLIGRAM(S): 600 INJECTION, SOLUTION INTRAVENOUS at 09:57

## 2024-06-08 RX ADMIN — PYRIDOSTIGMINE BROMIDE 120 MILLIGRAM(S): 60 SOLUTION ORAL at 08:36

## 2024-06-08 RX ADMIN — AZATHIOPRINE 50 MILLIGRAM(S): 100 TABLET ORAL at 19:57

## 2024-06-08 RX ADMIN — PYRIDOSTIGMINE BROMIDE 120 MILLIGRAM(S): 60 SOLUTION ORAL at 13:08

## 2024-06-08 RX ADMIN — LINEZOLID 600 MILLIGRAM(S): 600 INJECTION, SOLUTION INTRAVENOUS at 21:32

## 2024-06-08 RX ADMIN — GABAPENTIN 300 MILLIGRAM(S): 400 CAPSULE ORAL at 09:57

## 2024-06-08 RX ADMIN — Medication 250 MICROGRAM(S): at 09:57

## 2024-06-08 RX ADMIN — GABAPENTIN 300 MILLIGRAM(S): 400 CAPSULE ORAL at 21:32

## 2024-06-08 RX ADMIN — LISINOPRIL 40 MILLIGRAM(S): 2.5 TABLET ORAL at 09:57

## 2024-06-08 NOTE — PROGRESS NOTE ADULT - ASSESSMENT
A: 63yr old male seen for the following:   - Bilateral LE partial thickness wounds; chronic     P:   Chart reviewed and Patient evaluated;  Discussed diagnosis and treatment with patient.  X-rays reviewed : on wet read showing no soft tissue emphysema  WC left LE: kimi to the wound on the Left LE mid leg and santyl on the left foot wound followed by unna boot to the left LE on 6/5/2024, next change 6/12/2024;   WC right foot: santyl DSD to the Right foot  WBAT to gerardo feet  Offloading to bilateral heels while bedbound.   Continue with antibiotics as per ID  No podiatric surgical intervention warranted at this time. Pt stable from podiatric standpoint, Pt to f/u with Dr Castanon at the wound care center within a week after discharge once deemed stable by other specialties  All additional care per Med appreciated  Patient demonstrated verbal understanding of all interventions and tolerated interventions well without any complications.   Podiatry will follow while in house

## 2024-06-08 NOTE — PROGRESS NOTE ADULT - ASSESSMENT
63M w/ permanent A-fib, myasthenia gravis, HTN, BUDDY (non-adherent to CPAP), asthma, NSTEMI, p/w R foot ulcer. Patient had wound culture done outpatient by his PCP and was sent to ED for IV antibiotics.    # Bilateral foot ulcers  - Continue linezolid per id  - F/u blood cx ngtd  -Podiatry consult reviewed    # H/o permanent A-fib   -On coumadin, digoxin    # Myasthenia gravis- Continue mestinon     # HTN- Continue lisinopril, lasix    # Chronic pain  - Continue oxycodone, gabapentin    # HLD  - Atorvastatin    DVT proph:  Coumadin   Code Status: FULL code 	  dispo: discharge pending improvement in clinical status, possible in the next 24-48 hrs    Outpatient follow-up:   with PMD  with vascular surg  with podiatry

## 2024-06-09 ENCOUNTER — TRANSCRIPTION ENCOUNTER (OUTPATIENT)
Age: 64
End: 2024-06-09

## 2024-06-09 VITALS
RESPIRATION RATE: 18 BRPM | OXYGEN SATURATION: 98 % | SYSTOLIC BLOOD PRESSURE: 124 MMHG | TEMPERATURE: 98 F | HEART RATE: 89 BPM | DIASTOLIC BLOOD PRESSURE: 84 MMHG

## 2024-06-09 LAB
INR BLD: 2.55 RATIO — HIGH (ref 0.85–1.18)
PROTHROM AB SERPL-ACNC: 28 SEC — HIGH (ref 9.5–13)

## 2024-06-09 PROCEDURE — 99239 HOSP IP/OBS DSCHRG MGMT >30: CPT

## 2024-06-09 RX ORDER — OXYCODONE AND ACETAMINOPHEN 5; 325 MG/1; MG/1
2 TABLET ORAL
Qty: 56 | Refills: 0
Start: 2024-06-09 | End: 2024-06-15

## 2024-06-09 RX ORDER — OXYCODONE AND ACETAMINOPHEN 5; 325 MG/1; MG/1
3 TABLET ORAL
Qty: 0 | Refills: 0 | DISCHARGE
Start: 2024-06-09

## 2024-06-09 RX ORDER — LINEZOLID 600 MG/300ML
1 INJECTION, SOLUTION INTRAVENOUS
Qty: 4 | Refills: 0
Start: 2024-06-09 | End: 2024-06-10

## 2024-06-09 RX ORDER — OXYCODONE AND ACETAMINOPHEN 5; 325 MG/1; MG/1
2 TABLET ORAL
Qty: 0 | Refills: 0 | DISCHARGE
Start: 2024-06-09

## 2024-06-09 RX ORDER — NALOXONE HYDROCHLORIDE 4 MG/.1ML
1 SPRAY NASAL
Qty: 1 | Refills: 0
Start: 2024-06-09

## 2024-06-09 RX ADMIN — Medication 250 MICROGRAM(S): at 10:14

## 2024-06-09 RX ADMIN — PYRIDOSTIGMINE BROMIDE 120 MILLIGRAM(S): 60 SOLUTION ORAL at 12:48

## 2024-06-09 RX ADMIN — LISINOPRIL 40 MILLIGRAM(S): 2.5 TABLET ORAL at 10:14

## 2024-06-09 RX ADMIN — Medication 81 MILLIGRAM(S): at 10:13

## 2024-06-09 RX ADMIN — AZATHIOPRINE 50 MILLIGRAM(S): 100 TABLET ORAL at 08:12

## 2024-06-09 RX ADMIN — PYRIDOSTIGMINE BROMIDE 120 MILLIGRAM(S): 60 SOLUTION ORAL at 08:11

## 2024-06-09 RX ADMIN — AZATHIOPRINE 50 MILLIGRAM(S): 100 TABLET ORAL at 12:47

## 2024-06-09 RX ADMIN — LINEZOLID 600 MILLIGRAM(S): 600 INJECTION, SOLUTION INTRAVENOUS at 10:14

## 2024-06-09 RX ADMIN — PYRIDOSTIGMINE BROMIDE 60 MILLIGRAM(S): 60 SOLUTION ORAL at 19:38

## 2024-06-09 RX ADMIN — GABAPENTIN 300 MILLIGRAM(S): 400 CAPSULE ORAL at 10:14

## 2024-06-09 RX ADMIN — AZATHIOPRINE 50 MILLIGRAM(S): 100 TABLET ORAL at 19:38

## 2024-06-09 NOTE — DISCHARGE NOTE PROVIDER - ATTENDING DISCHARGE PHYSICAL EXAMINATION:
Physical Exam  T(C): 36.7 (06-09-24 @ 08:36), Max: 36.9 (06-08-24 @ 16:16)  HR: 77 (06-09-24 @ 08:36) (77 - 85)  BP: 128/71 (06-09-24 @ 08:36) (128/71 - 142/88)  RR: 18 (06-09-24 @ 08:36) (18 - 18)  SpO2: 95% (06-09-24 @ 08:36) (95% - 97%)  General Appearance nad, no head trauma.   EYES no scleral icterus.   CHEST clear to auscultation bilaterally, no wheezes/rhonchi/rales.   HEART RRR, normal S1 S2, no murmurs, clicks or rubs.   BACK no CVA , no spinal tenderness.   ABDOMEN soft NT/ND, BS present.   EXTREMITIES no clubbing, no cyanosis, no edema.   b/l legs dressed  Skin normal skin, limited exam.   Psych normal affect.

## 2024-06-09 NOTE — DISCHARGE NOTE PROVIDER - CARE PROVIDER_API CALL
Galen Thorne  Internal Medicine  [address] [place] [address]  Phone: [phone]  Fax: [phone]  Established Patient  Follow Up Time: 1 week    Solomon Castanon  Foot and Ankle Surgery  [address], [address]  [place] [address]  Phone: [phone]  Fax: [phone]  Established Patient  Follow Up Time: 1 week

## 2024-06-09 NOTE — DISCHARGE NOTE NURSING/CASE MANAGEMENT/SOCIAL WORK - PATIENT PORTAL LINK FT
You can access the FollowMyHealth Patient Portal offered by [facility] by registering at the following website: [email or web address]. By joining [facility]’s FollowMyHealth portal, you will also be able to view your health information using other applications (apps) compatible with our system.

## 2024-06-09 NOTE — PROGRESS NOTE ADULT - SUBJECTIVE AND OBJECTIVE BOX
no overnight events. doing about the same. pain controlled with percocet but has difficulty ambulating due to ulcers.  getting abx    Physical Exam  T(C): 36.9 (06-08-24 @ 08:31), Max: 36.9 (06-08-24 @ 08:31)  HR: 86 (06-08-24 @ 09:49) (70 - 89)  BP: 149/67 (06-08-24 @ 09:49) (125/72 - 154/72)  RR: 18 (06-08-24 @ 08:31) (17 - 18)  SpO2: 97% (06-08-24 @ 08:31) (97% - 99%)  General Appearance nad, no head trauma.   EYES no scleral icterus.   EXTREMITIES no clubbing, no cyanosis, trace b/l edema.   b/l legs dressed  Skin normal skin, limited exam.   Psych normal affect.     Pertinent Labs/Imaging:  wbc 7.7  inr 2.29
no overnight events. doing well overall. still has a lot of pain. cant ambulate much as a result, using percocets    Physical Exam  T(C): 36.4 (06-07-24 @ 08:19), Max: 37 (06-06-24 @ 20:20)  HR: 89 (06-07-24 @ 08:19) (80 - 96)  BP: 144/73 (06-07-24 @ 08:19) (132/75 - 144/84)  RR: 18 (06-07-24 @ 08:19) (18 - 18)  SpO2: 95% (06-07-24 @ 08:19) (95% - 100%)  General Appearance nad, no head trauma.   EYES no scleral icterus.   CHEST clear to auscultation bilaterally, no wheezes/rhonchi/rales.   HEART RRR, normal S1 S2, no murmurs, clicks or rubs.   BACK no CVA , no spinal tenderness.   ABDOMEN soft NT/ND, BS present.   EXTREMITIES no clubbing, no cyanosis, no edema.   Skin normal skin, limited exam.   Psych normal affect.     Pertinent Labs/Imaging:  
no overnight events. doing well overall.  wife at [employer].     Physical Exam  T(C): 36.9 (06-06-24 @ 08:14), Max: 36.9 (06-06-24 @ 08:14)  HR: 98 (06-06-24 @ 08:14) (76 - 98)  BP: 149/80 (06-06-24 @ 08:14) (132/78 - 149/80)  RR: 18 (06-06-24 @ 08:14) (18 - 18)  SpO2: 98% (06-06-24 @ 08:14) (98% - 99%)  General Appearance nad, no head trauma.   EYES no scleral icterus.   EXTREMITIES no clubbing, no cyanosis,  b/l nonpitting edema.   Skin normal skin, limited exam.   Psych normal affect.   picutred reviewed of wounds    Pertinent Labs/Imaging:  wbc 8.59  inr 1.7  
Date of service: 6/7/2024    Chief Complaint : Patient is a 63y old  Male who presents with a chief complaint of R foot ulcer (05 Jun 2024 13:06)    HPI :   64 y/o M w/ PMH permanent A-fib, myasthenia gravis, HTN, BUDDY (non-adherent to CPAP), asthma, NSTEMI, p/w R foot ulcer. Pt history of gerardo foot wounds which are chronic and managed by local wound care;  Pt follows up at the [facility] wound care center. Was last seen on Friday, per pt he saw his PCP on Monday and was told to go for IV antibiotics by his PCP after getting the culture report; per pt the culture reports were given upon admission. States he has gerardo leg swelling which has gone down last few weeks. Denies f/c/n/v/sob.    6/7/2024: Pt seen by podiatry today. NAD, resting bedside, having breakfast.       PMH: No pertinent past medical history    Afib    Myasthenia gravis    PSH:No significant past surgical history    Allergies:vancomycin (Unknown)  sulfamethoxazole-trimethoprim (Flushing; Urticaria; Rash)  Keflex (Flushing; Urticaria; Rash)      Labs:                         13.1   8.29  )-----------( 326      ( 07 Jun 2024 07:40 )             41.6   06-07    136  |  108  |  11  ----------------------------<  87  4.2   |  29  |  0.86    Ca    8.5      07 Jun 2024 07:40    TPro  6.2  /  Alb  2.9<L>  /  TBili  0.7  /  DBili  x   /  AST  19  /  ALT  23  /  AlkPhos  50  06-06    Vital Signs Last 24 Hrs  T(C): 36.4 (07 Jun 2024 08:19), Max: 37 (06 Jun 2024 20:20)  T(F): 97.5 (07 Jun 2024 08:19), Max: 98.6 (06 Jun 2024 20:20)  HR: 89 (07 Jun 2024 08:19) (80 - 96)  BP: 144/73 (07 Jun 2024 08:19) (132/75 - 144/84)  BP(mean): --  RR: 18 (07 Jun 2024 08:19) (18 - 18)  SpO2: 95% (07 Jun 2024 08:19) (95% - 100%)    Parameters below as of 07 Jun 2024 08:19  Patient On (Oxygen Delivery Method): room air      REVIEW OF SYSTEMS:    CONSTITUTIONAL: No weakness, fevers or chills  EYES: No visual changes  RESPIRATORY: No cough, wheezing; No shortness of breath  CARDIOVASCULAR: No chest pain or palpitations  GASTROINTESTINAL: No abdominal or epigastric pain. No nausea, vomiting; No diarrhea or constipation.   GENITOURINARY: No dysuria, frequency or hematuria  NEUROLOGICAL: No numbness or weakness  SKIN: See physical examination.  All other review of systems is negative unless indicated above    Physical Exam:   Constitutional: NAD, alert;  Lower Extremity Focus  Derm:  Skin warm, dry and supple bilateral.    Partial thickness ulceration noted to the right foot dorsal aspect of the forefoot covered with eschar tissue, no periwound edema or erythema noted, no discharge noted. Wound size aprox 9cm x 3cm  Left foot: Partial thickness ulceration noted to the medial aspect of the mid leg with some scant serous drainage, wound size aprox 2.5cm x 1.5 cm, periwound edema or erythema noted. Partial thickness wound noted to the dorsum of the left foot 1.5cm x 2cm, no drainage noted, no periwound edema or erythema noted, no malodor noted.   Diffuse +2cm pitting edema noted to the gerardo LE.   Vascular: Dorsalis Pedis 2/4 Posterior Tibial pulses 1/4; Capillary re-fill time less then 3 seconds digits 1-5 bilateral.    Neuro: Protective sensation intact to the level of the digits bilateral.  MSK: Muscle strength 5/5 all major muscle groups bilateral.     < from: Xray Foot AP + Lateral + Oblique, Right (06.04.24 @ 20:27) >    ACC: [id number] EXAM:  XR FOOT COMP MIN 3 VIEWS RT   ORDERED BY: CARYN BLACKMON     PROCEDURE DATE:  06/04/2024          INTERPRETATION:  Right foot    HISTORY: Foot infection    COMPARISON: 5/27/2024     3 views of the right foot show partial healing ofthe fifth metatarsal   fracture. Bipartite sesamoid bones of the first metatarsal are again   noted. The joint spaces are maintained.    IMPRESSION: Healing fracture.      Thank you for this referral.    --- End of Report ---    < end of copied text >      
Date of service: 6/8/2024    Chief Complaint : Patient is a 63y old  Male who presents with a chief complaint of R foot ulcer (05 Jun 2024 13:06)    HPI :   64 y/o M w/ PMH permanent A-fib, myasthenia gravis, HTN, BUDDY (non-adherent to CPAP), asthma, NSTEMI, p/w R foot ulcer. Pt history of gerardo foot wounds which are chronic and managed by local wound care;  Pt follows up at the [facility] wound care center. Was last seen on Friday, per pt he saw his PCP on Monday and was told to go for IV antibiotics by his PCP after getting the culture report; per pt the culture reports were given upon admission. States he has gerardo leg swelling which has gone down last few weeks. Denies f/c/n/v/sob.    6/8/2024: Pt seen by podiatry today. NAD, resting bedside, no new pedal complaints.      PMH: No pertinent past medical history    Afib    Myasthenia gravis    PSH:No significant past surgical history    Allergies:vancomycin (Unknown)  sulfamethoxazole-trimethoprim (Flushing; Urticaria; Rash)  Keflex (Flushing; Urticaria; Rash)      Labs:                         13.0   7.77  )-----------( 314      ( 08 Jun 2024 06:57 )             40.8   06-07    136  |  108  |  11  ----------------------------<  87  4.2   |  29  |  0.86    Ca    8.5      07 Jun 2024 07:40      Vital Signs Last 24 Hrs  T(C): 36.9 (08 Jun 2024 08:31), Max: 36.9 (08 Jun 2024 08:31)  T(F): 98.4 (08 Jun 2024 08:31), Max: 98.4 (08 Jun 2024 08:31)  HR: 86 (08 Jun 2024 09:49) (70 - 89)  BP: 149/67 (08 Jun 2024 09:49) (125/72 - 154/72)  BP(mean): --  RR: 18 (08 Jun 2024 08:31) (18 - 18)  SpO2: 97% (08 Jun 2024 08:31) (97% - 99%)    Parameters below as of 08 Jun 2024 08:31  Patient On (Oxygen Delivery Method): room air      REVIEW OF SYSTEMS:    CONSTITUTIONAL: No weakness, fevers or chills  EYES: No visual changes  RESPIRATORY: No cough, wheezing; No shortness of breath  CARDIOVASCULAR: No chest pain or palpitations  GASTROINTESTINAL: No abdominal or epigastric pain. No nausea, vomiting; No diarrhea or constipation.   GENITOURINARY: No dysuria, frequency or hematuria  NEUROLOGICAL: No numbness or weakness  SKIN: See physical examination.  All other review of systems is negative unless indicated above    Physical Exam:   Constitutional: NAD, alert;  Lower Extremity Focus  Derm:  Skin warm, dry and supple bilateral.    Partial thickness ulceration noted to the right foot dorsal aspect of the forefoot covered with eschar tissue, no periwound edema or erythema noted, no discharge noted. Wound size aprox 9cm x 3cm  Left foot: Partial thickness ulceration noted to the medial aspect of the mid leg with some scant serous drainage, wound size aprox 2.5cm x 1.5 cm, periwound edema or erythema noted. Partial thickness wound noted to the dorsum of the left foot 1.5cm x 2cm, no drainage noted, no periwound edema or erythema noted, no malodor noted.   Diffuse +2cm pitting edema noted to the gerardo LE.   Vascular: Dorsalis Pedis 2/4 Posterior Tibial pulses 1/4; Capillary re-fill time less then 3 seconds digits 1-5 bilateral.    Neuro: Protective sensation intact to the level of the digits bilateral.  MSK: Muscle strength 5/5 all major muscle groups bilateral.     < from: Xray Foot AP + Lateral + Oblique, Right (06.04.24 @ 20:27) >    ACC: [id number] EXAM:  XR FOOT COMP MIN 3 VIEWS RT   ORDERED BY: CARYN BLACKMON     PROCEDURE DATE:  06/04/2024          INTERPRETATION:  Right foot    HISTORY: Foot infection    COMPARISON: 5/27/2024     3 views of the right foot show partial healing ofthe fifth metatarsal   fracture. Bipartite sesamoid bones of the first metatarsal are again   noted. The joint spaces are maintained.    IMPRESSION: Healing fracture.      Thank you for this referral.    --- End of Report ---    < end of copied text >      
Date of service: 6/9/2024    Chief Complaint : Patient is a 63y old  Male who presents with a chief complaint of R foot ulcer (05 Jun 2024 13:06)    HPI :   62 y/o M w/ PMH permanent A-fib, myasthenia gravis, HTN, BUDDY (non-adherent to CPAP), asthma, NSTEMI, p/w R foot ulcer. Pt history of gerardo foot wounds which are chronic and managed by local wound care;  Pt follows up at the [facility] wound care center. Was last seen on Friday, per pt he saw his PCP on Monday and was told to go for IV antibiotics by his PCP after getting the culture report; per pt the culture reports were given upon admission. States he has gerardo leg swelling which has gone down last few weeks. Denies f/c/n/v/sob.    6/9/2024: Pt seen by podiatry today. NAD, resting bedside, pleasant, no new pedal complaints.      PMH: No pertinent past medical history    Afib    Myasthenia gravis    PSH:No significant past surgical history    Allergies:vancomycin (Unknown)  sulfamethoxazole-trimethoprim (Flushing; Urticaria; Rash)  Keflex (Flushing; Urticaria; Rash)      Labs:                         13.0   7.77  )-----------( 314      ( 08 Jun 2024 06:57 )             40.8   Vital Signs Last 24 Hrs  T(C): 36.7 (09 Jun 2024 08:36), Max: 36.9 (08 Jun 2024 16:16)  T(F): 98.1 (09 Jun 2024 08:36), Max: 98.5 (08 Jun 2024 21:54)  HR: 77 (09 Jun 2024 08:36) (77 - 85)  BP: 128/71 (09 Jun 2024 08:36) (128/71 - 142/88)  BP(mean): --  RR: 18 (09 Jun 2024 08:36) (18 - 18)  SpO2: 95% (09 Jun 2024 08:36) (95% - 97%)    Parameters below as of 09 Jun 2024 08:36  Patient On (Oxygen Delivery Method): room air      REVIEW OF SYSTEMS:    CONSTITUTIONAL: No weakness, fevers or chills  EYES: No visual changes  RESPIRATORY: No cough, wheezing; No shortness of breath  CARDIOVASCULAR: No chest pain or palpitations  GASTROINTESTINAL: No abdominal or epigastric pain. No nausea, vomiting; No diarrhea or constipation.   GENITOURINARY: No dysuria, frequency or hematuria  NEUROLOGICAL: No numbness or weakness  SKIN: See physical examination.  All other review of systems is negative unless indicated above    Physical Exam:   Constitutional: NAD, alert;  Lower Extremity Focus  Derm:  Skin warm, dry and supple bilateral.    Partial thickness ulceration noted to the right foot dorsal aspect of the forefoot covered with eschar tissue, no periwound edema or erythema noted, no discharge noted. Wound size aprox 9cm x 3cm  Left foot: Partial thickness ulceration noted to the medial aspect of the mid leg with some scant serous drainage, wound size aprox 2.5cm x 1.5 cm, periwound edema or erythema noted. Partial thickness wound noted to the dorsum of the left foot 1.5cm x 2cm, no drainage noted, no periwound edema or erythema noted, no malodor noted.   Diffuse +2cm pitting edema noted to the gerardo LE.   Vascular: Dorsalis Pedis 2/4 Posterior Tibial pulses 1/4; Capillary re-fill time less then 3 seconds digits 1-5 bilateral.    Neuro: Protective sensation intact to the level of the digits bilateral.  MSK: Muscle strength 5/5 all major muscle groups bilateral.     < from: Xray Foot AP + Lateral + Oblique, Right (06.04.24 @ 20:27) >    ACC: [id number] EXAM:  XR FOOT COMP MIN 3 VIEWS RT   ORDERED BY: CARYN BLACKMON     PROCEDURE DATE:  06/04/2024          INTERPRETATION:  Right foot    HISTORY: Foot infection    COMPARISON: 5/27/2024     3 views of the right foot show partial healing ofthe fifth metatarsal   fracture. Bipartite sesamoid bones of the first metatarsal are again   noted. The joint spaces are maintained.    IMPRESSION: Healing fracture.      Thank you for this referral.    --- End of Report ---    < end of copied text >      
HOSPITALIST ATTENDING PROGRESS NOTE    Chart and meds reviewed.  Patient seen and examined.    CC: Foot wound    Subjective: No acute issues overnight. No fever.     All other systems reviewed and found to be negative with the exception of what has been described above.    MEDICATIONS  (STANDING):  aspirin enteric coated 81 milliGRAM(s) Oral daily  atorvastatin 40 milliGRAM(s) Oral at bedtime  azaTHIOprine 50 milliGRAM(s) Oral <User Schedule>  digoxin     Tablet 250 MICROGram(s) Oral daily  gabapentin 300 milliGRAM(s) Oral two times a day  linezolid  IVPB 600 milliGRAM(s) IV Intermittent every 12 hours  lisinopril 40 milliGRAM(s) Oral daily  pyridostigmine 120 milliGRAM(s) Oral three times a day    MEDICATIONS  (PRN):  acetaminophen     Tablet .. 650 milliGRAM(s) Oral every 6 hours PRN Temp greater or equal to 38C (100.4F), Mild Pain (1 - 3)  albuterol    90 MICROgram(s) HFA Inhaler 2 Puff(s) Inhalation every 6 hours PRN Bronchospasm  furosemide    Tablet 40 milliGRAM(s) Oral daily PRN edema  oxyCODONE    IR 10 milliGRAM(s) Oral every 6 hours PRN Moderate Pain (4 - 6)  silver sulfADIAZINE 1% Cream 1 Application(s) Topical two times a day PRN Wound Care    Vital Signs Last 24 Hrs  T(C): 36.6 (05 Jun 2024 06:55), Max: 37.2 (04 Jun 2024 17:51)  T(F): 97.9 (05 Jun 2024 06:55), Max: 99 (04 Jun 2024 17:51)  HR: 68 (05 Jun 2024 09:50) (67 - 89)  BP: 169/86 (05 Jun 2024 09:50) (131/82 - 169/86)  BP(mean): --  RR: 18 (05 Jun 2024 06:55) (18 - 18)  SpO2: 99% (05 Jun 2024 06:55) (97% - 99%)    Parameters below as of 05 Jun 2024 06:55  Patient On (Oxygen Delivery Method): room air    GEN: NAD  HEENT:  pupils equal and reactive, EOMI, no oropharyngeal lesions, erythema, exudates, oral thrush  NECK:   supple, no carotid bruits  CV:  +S1, +S2, regular, no murmurs  RESP:   lungs clear to auscultation bilaterally, no wheezing, rales, rhonchi, good air entry bilaterally  GI:  abdomen soft, non-tender, non-distended, normal BS  EXT:  no clubbing, no cyanosis, no edema, no calf pain, swelling or erythema  NEURO:  AAOX3, no focal neurological deficits, follows all commands, able to move extremities spontaneously  SKIN: Bilateral foot wounds, Left Gabrielle boot    LABS:                          13.3   7.43  )-----------( 324      ( 05 Jun 2024 06:42 )             42.2     06-05    138  |  109<H>  |  17  ----------------------------<  88  4.1   |  27  |  0.86    Ca    8.5      05 Jun 2024 06:42    TPro  6.3  /  Alb  3.0<L>  /  TBili  0.7  /  DBili  x   /  AST  23  /  ALT  27  /  AlkPhos  50  06-05        LIVER FUNCTIONS - ( 05 Jun 2024 06:42 )  Alb: 3.0 g/dL / Pro: 6.3 gm/dL / ALK PHOS: 50 U/L / ALT: 27 U/L / AST: 23 U/L / GGT: x           PT/INR - ( 05 Jun 2024 06:42 )   PT: 18.2 sec;   INR: 1.63 ratio    PTT - ( 04 Jun 2024 20:25 )  PTT:36.9 sec    Urinalysis Basic - ( 05 Jun 2024 06:42 )  Color: x / Appearance: x / SG: x / pH: x  Gluc: 88 mg/dL / Ketone: x  / Bili: x / Urobili: x   Blood: x / Protein: x / Nitrite: x   Leuk Esterase: x / RBC: x / WBC x   Sq Epi: x / Non Sq Epi: x / Bacteria: x  
Date of service: 6/6/2024    Chief Complaint : Patient is a 63y old  Male who presents with a chief complaint of R foot ulcer (05 Jun 2024 13:06)    HPI :   62 y/o M w/ PMH permanent A-fib, myasthenia gravis, HTN, BUDDY (non-adherent to CPAP), asthma, NSTEMI, p/w R foot ulcer. Pt history of gerardo foot wounds which are chronic and managed by local wound care;  Pt follows up at the [facility] wound care center. Was last seen on Friday, per pt he saw his PCP on Monday and was told to go for IV antibiotics by his PCP after getting the culture report; per pt the culture reports were given upon admission. States he has gerardo leg swelling which has gone down last few weeks. Denies f/c/n/v/sob.    6/6/2024: Pt seen by podiatry today. NAD, resting bedside.      PMH: No pertinent past medical history    Afib    Myasthenia gravis    PSH:No significant past surgical history    Allergies:vancomycin (Unknown)  sulfamethoxazole-trimethoprim (Flushing; Urticaria; Rash)  Keflex (Flushing; Urticaria; Rash)      Labs:                         13.7   8.59  )-----------( 345      ( 06 Jun 2024 06:26 )             43.3   06-06    140  |  111<H>  |  11  ----------------------------<  95  4.3   |  28  |  0.84    Ca    9.0      06 Jun 2024 06:26    TPro  6.2  /  Alb  2.9<L>  /  TBili  0.7  /  DBili  x   /  AST  19  /  ALT  23  /  AlkPhos  50  06-06    Vital Signs Last 24 Hrs  T(C): 36.9 (06 Jun 2024 08:14), Max: 36.9 (06 Jun 2024 08:14)  T(F): 98.4 (06 Jun 2024 08:14), Max: 98.4 (06 Jun 2024 08:14)  HR: 98 (06 Jun 2024 08:14) (76 - 98)  BP: 149/80 (06 Jun 2024 08:14) (132/78 - 149/80)  BP(mean): --  RR: 18 (06 Jun 2024 08:14) (18 - 18)  SpO2: 98% (06 Jun 2024 08:14) (95% - 99%)    Parameters below as of 06 Jun 2024 08:14  Patient On (Oxygen Delivery Method): room air      REVIEW OF SYSTEMS:    CONSTITUTIONAL: No weakness, fevers or chills  EYES: No visual changes  RESPIRATORY: No cough, wheezing; No shortness of breath  CARDIOVASCULAR: No chest pain or palpitations  GASTROINTESTINAL: No abdominal or epigastric pain. No nausea, vomiting; No diarrhea or constipation.   GENITOURINARY: No dysuria, frequency or hematuria  NEUROLOGICAL: No numbness or weakness  SKIN: See physical examination.  All other review of systems is negative unless indicated above    Physical Exam:   Constitutional: NAD, alert;  Lower Extremity Focus  Derm:  Skin warm, dry and supple bilateral.    Partial thickness ulceration noted to the right foot dorsal aspect of the forefoot covered with eschar tissue, no periwound edema or erythema noted, no discharge noted. Wound size aprox 9cm x 3cm  Left foot: Partial thickness ulceration noted to the medial aspect of the mid leg with some scant serous drainage, wound size aprox 2.5cm x 1.5 cm, periwound edema or erythema noted. Partial thickness wound noted to the dorsum of the left foot 1.5cm x 2cm, no drainage noted, no periwound edema or erythema noted, no malodor noted.   Diffuse +2cm pitting edema noted to the gerardo LE.   Vascular: Dorsalis Pedis 2/4 Posterior Tibial pulses 1/4; Capillary re-fill time less then 3 seconds digits 1-5 bilateral.    Neuro: Protective sensation intact to the level of the digits bilateral.  MSK: Muscle strength 5/5 all major muscle groups bilateral.     < from: Xray Foot AP + Lateral + Oblique, Right (06.04.24 @ 20:27) >    ACC: [id number] EXAM:  XR FOOT COMP MIN 3 VIEWS RT   ORDERED BY: CARYN BLACKMON     PROCEDURE DATE:  06/04/2024          INTERPRETATION:  Right foot    HISTORY: Foot infection    COMPARISON: 5/27/2024     3 views of the right foot show partial healing ofthe fifth metatarsal   fracture. Bipartite sesamoid bones of the first metatarsal are again   noted. The joint spaces are maintained.    IMPRESSION: Healing fracture.      Thank you for this referral.    --- End of Report ---    < end of copied text >

## 2024-06-09 NOTE — DISCHARGE NOTE PROVIDER - REASON FOR ADMISSION
R foot ulcer [Patient Intake Form Reviewed] : Patient intake form was reviewed [Negative] : Allergic/Immunologic

## 2024-06-09 NOTE — DISCHARGE NOTE PROVIDER - HOSPITAL COURSE
63M w/ permanent A-fib, myasthenia gravis, HTN, BUDDY (non-adherent to CPAP), asthma, NSTEMI, p/w R foot ulcer. Patient had wound culture done outpatient by his PCP and was sent to ED for IV antibiotics. he remained stable on linezolid. seen by podiatry and ID. His stay was prolonged due to lack of pain control leading to inability to ambulate. will be discharged to finish linezolid course and continue with wound care and pain control. 7 days of q6hr percocet send to his pharmacy.

## 2024-06-09 NOTE — DISCHARGE NOTE PROVIDER - NSDCCPCAREPLAN_GEN_ALL_CORE_FT
PRINCIPAL DISCHARGE DIAGNOSIS  Diagnosis: Diabetic ulcer of right foot  Assessment and Plan of Treatment:

## 2024-06-09 NOTE — DISCHARGE NOTE PROVIDER - NSDCMRMEDTOKEN_GEN_ALL_CORE_FT
Albuterol (Eqv-Proventil HFA) 90 mcg/inh inhalation aerosol: 2 puff(s) inhaled every 6 hours, As Needed  aspirin 81 mg oral delayed release tablet: 1 tab(s) orally once a day  atorvastatin 40 mg oral tablet: 1 tab(s) orally once a day (at bedtime)  azaTHIOprine 50 mg oral tablet: 1 tab(s) orally 3 times a day  digoxin 250 mcg (0.25 mg) oral tablet: 1 tab(s) orally once a day  fluticasone-salmeterol 100 mcg-50 mcg inhalation powder: 1 inhaled 2 times a day  furosemide 40 mg oral tablet: 1 tab(s) orally once a day as needed for edema  gabapentin 300 mg oral capsule: 1 cap(s) orally 2 times a day  linezolid 600 mg oral tablet: 1 tab(s) orally every 12 hours  naloxone 4 mg/0.1 mL nasal spray: 1 spray(s) intranasally every 10 minutes  oxycodone-acetaminophen 5 mg-325 mg oral tablet: 2 tab(s) orally every 6 hours as needed for Moderate Pain (4 - 6) MDD: 3  oxycodone-acetaminophen 5 mg-325 mg oral tablet: 2 tab(s) orally every 6 hours As needed Moderate Pain (4 - 6)  oxycodone-acetaminophen 5 mg-325 mg oral tablet: 3 tab(s) orally every 6 hours As needed Severe Pain (7 - 10)  pyridostigmine 60 mg oral tablet: 2 tab(s) orally 2 times a day in the morning and afternoon  ***pt takes 5 tabs daily, 2 in the am, 2 in the afternoon, and 1 in the pm***  pyridostigmine 60 mg oral tablet: 1 tab(s) orally once a day (at bedtime)  ramipril 10 mg oral tablet: orally once a day  Silvadene 1% topical cream: Apply topically to affected area 2 times a day, As Needed  warfarin 10 mg oral tablet: 10 milligram(s) orally once a day

## 2024-06-09 NOTE — PROGRESS NOTE ADULT - REASON FOR ADMISSION
R foot ulcer

## 2024-06-09 NOTE — DISCHARGE NOTE PROVIDER - PROVIDER TOKENS
PROVIDER:[TOKEN:[[id number]],FOLLOWUP:[1 week],ESTABLISHEDPATIENT:[T]],PROVIDER:[TOKEN:[[id number]],FOLLOWUP:[1 week],ESTABLISHEDPATIENT:[T]]

## 2024-06-09 NOTE — PROGRESS NOTE ADULT - ATTENDING COMMENTS
I agree with the assessment and plan
I agree with assessment and plan
I agree with the assessment and plan

## 2024-06-10 NOTE — CHART NOTE - NSCHARTNOTEFT_GEN_A_CORE
Patient was outreached but did not answer. A voicemail was left for the patient to return our call.
31-Aug-2018

## 2024-06-11 DIAGNOSIS — L97.822 NON-PRESSURE CHRONIC ULCER OF OTHER PART OF LEFT LOWER LEG WITH FAT LAYER EXPOSED: ICD-10-CM

## 2024-06-11 DIAGNOSIS — I89.0 LYMPHEDEMA, NOT ELSEWHERE CLASSIFIED: ICD-10-CM

## 2024-06-11 DIAGNOSIS — L03.116 CELLULITIS OF LEFT LOWER LIMB: ICD-10-CM

## 2024-06-11 DIAGNOSIS — I87.312 CHRONIC VENOUS HYPERTENSION (IDIOPATHIC) WITH ULCER OF LEFT LOWER EXTREMITY: ICD-10-CM

## 2024-06-11 DIAGNOSIS — I10 ESSENTIAL (PRIMARY) HYPERTENSION: ICD-10-CM

## 2024-06-11 DIAGNOSIS — L97.528 NON-PRESSURE CHRONIC ULCER OF OTHER PART OF LEFT FOOT WITH OTHER SPECIFIED SEVERITY: ICD-10-CM

## 2024-06-11 DIAGNOSIS — L03.115 CELLULITIS OF RIGHT LOWER LIMB: ICD-10-CM

## 2024-06-11 DIAGNOSIS — L97.518 NON-PRESSURE CHRONIC ULCER OF OTHER PART OF RIGHT FOOT WITH OTHER SPECIFIED SEVERITY: ICD-10-CM

## 2024-06-11 DIAGNOSIS — J45.998 OTHER ASTHMA: ICD-10-CM

## 2024-06-11 DIAGNOSIS — M19.90 UNSPECIFIED OSTEOARTHRITIS, UNSPECIFIED SITE: ICD-10-CM

## 2024-06-14 ENCOUNTER — OUTPATIENT (OUTPATIENT)
Dept: OUTPATIENT SERVICES | Facility: HOSPITAL | Age: 64
LOS: 1 days | End: 2024-06-14

## 2024-06-14 DIAGNOSIS — L97.518 NON-PRESSURE CHRONIC ULCER OF OTHER PART OF RIGHT FOOT WITH OTHER SPECIFIED SEVERITY: ICD-10-CM

## 2024-06-14 PROCEDURE — 11042 DBRDMT SUBQ TIS 1ST 20SQCM/<: CPT

## 2024-06-14 PROCEDURE — 11045 DBRDMT SUBQ TISS EACH ADDL: CPT

## 2024-06-14 RX ORDER — SODIUM HYPOCHLORITE 0.125 %
1 SOLUTION, NON-ORAL MISCELLANEOUS ONCE
Refills: 0 | Status: ACTIVE | OUTPATIENT
Start: 2024-06-14

## 2024-06-15 DIAGNOSIS — G47.33 OBSTRUCTIVE SLEEP APNEA (ADULT) (PEDIATRIC): ICD-10-CM

## 2024-06-15 DIAGNOSIS — L97.512 NON-PRESSURE CHRONIC ULCER OF OTHER PART OF RIGHT FOOT WITH FAT LAYER EXPOSED: ICD-10-CM

## 2024-06-15 DIAGNOSIS — G70.00 MYASTHENIA GRAVIS WITHOUT (ACUTE) EXACERBATION: ICD-10-CM

## 2024-06-15 DIAGNOSIS — Z88.2 ALLERGY STATUS TO SULFONAMIDES: ICD-10-CM

## 2024-06-15 DIAGNOSIS — I10 ESSENTIAL (PRIMARY) HYPERTENSION: ICD-10-CM

## 2024-06-15 DIAGNOSIS — J45.909 UNSPECIFIED ASTHMA, UNCOMPLICATED: ICD-10-CM

## 2024-06-15 DIAGNOSIS — Z79.82 LONG TERM (CURRENT) USE OF ASPIRIN: ICD-10-CM

## 2024-06-15 DIAGNOSIS — B95.7 OTHER STAPHYLOCOCCUS AS THE CAUSE OF DISEASES CLASSIFIED ELSEWHERE: ICD-10-CM

## 2024-06-15 DIAGNOSIS — I48.19 OTHER PERSISTENT ATRIAL FIBRILLATION: ICD-10-CM

## 2024-06-15 DIAGNOSIS — L03.115 CELLULITIS OF RIGHT LOWER LIMB: ICD-10-CM

## 2024-06-15 DIAGNOSIS — L97.522 NON-PRESSURE CHRONIC ULCER OF OTHER PART OF LEFT FOOT WITH FAT LAYER EXPOSED: ICD-10-CM

## 2024-06-15 DIAGNOSIS — Z79.01 LONG TERM (CURRENT) USE OF ANTICOAGULANTS: ICD-10-CM

## 2024-06-15 DIAGNOSIS — Z88.1 ALLERGY STATUS TO OTHER ANTIBIOTIC AGENTS STATUS: ICD-10-CM

## 2024-06-15 DIAGNOSIS — E11.621 TYPE 2 DIABETES MELLITUS WITH FOOT ULCER: ICD-10-CM

## 2024-06-15 DIAGNOSIS — G89.29 OTHER CHRONIC PAIN: ICD-10-CM

## 2024-06-21 ENCOUNTER — OUTPATIENT (OUTPATIENT)
Dept: OUTPATIENT SERVICES | Facility: HOSPITAL | Age: 64
LOS: 1 days | End: 2024-06-21
Payer: COMMERCIAL

## 2024-06-21 DIAGNOSIS — I87.312 CHRONIC VENOUS HYPERTENSION (IDIOPATHIC) WITH ULCER OF LEFT LOWER EXTREMITY: ICD-10-CM

## 2024-06-21 DIAGNOSIS — L97.528 NON-PRESSURE CHRONIC ULCER OF OTHER PART OF LEFT FOOT WITH OTHER SPECIFIED SEVERITY: ICD-10-CM

## 2024-06-21 DIAGNOSIS — I89.0 LYMPHEDEMA, NOT ELSEWHERE CLASSIFIED: ICD-10-CM

## 2024-06-21 DIAGNOSIS — L97.822 NON-PRESSURE CHRONIC ULCER OF OTHER PART OF LEFT LOWER LEG WITH FAT LAYER EXPOSED: ICD-10-CM

## 2024-06-21 DIAGNOSIS — I10 ESSENTIAL (PRIMARY) HYPERTENSION: ICD-10-CM

## 2024-06-21 DIAGNOSIS — M19.90 UNSPECIFIED OSTEOARTHRITIS, UNSPECIFIED SITE: ICD-10-CM

## 2024-06-21 DIAGNOSIS — L97.518 NON-PRESSURE CHRONIC ULCER OF OTHER PART OF RIGHT FOOT WITH OTHER SPECIFIED SEVERITY: ICD-10-CM

## 2024-06-21 DIAGNOSIS — J45.998 OTHER ASTHMA: ICD-10-CM

## 2024-06-21 DIAGNOSIS — L03.115 CELLULITIS OF RIGHT LOWER LIMB: ICD-10-CM

## 2024-06-21 PROCEDURE — 11045 DBRDMT SUBQ TISS EACH ADDL: CPT

## 2024-06-21 PROCEDURE — 11042 DBRDMT SUBQ TIS 1ST 20SQCM/<: CPT

## 2024-06-21 PROCEDURE — 29580 STRAPPING UNNA BOOT: CPT | Mod: 59

## 2024-06-25 DIAGNOSIS — I89.0 LYMPHEDEMA, NOT ELSEWHERE CLASSIFIED: ICD-10-CM

## 2024-06-25 DIAGNOSIS — L97.822 NON-PRESSURE CHRONIC ULCER OF OTHER PART OF LEFT LOWER LEG WITH FAT LAYER EXPOSED: ICD-10-CM

## 2024-06-25 DIAGNOSIS — M19.90 UNSPECIFIED OSTEOARTHRITIS, UNSPECIFIED SITE: ICD-10-CM

## 2024-06-25 DIAGNOSIS — L03.115 CELLULITIS OF RIGHT LOWER LIMB: ICD-10-CM

## 2024-06-25 DIAGNOSIS — L97.528 NON-PRESSURE CHRONIC ULCER OF OTHER PART OF LEFT FOOT WITH OTHER SPECIFIED SEVERITY: ICD-10-CM

## 2024-06-25 DIAGNOSIS — J45.998 OTHER ASTHMA: ICD-10-CM

## 2024-06-25 DIAGNOSIS — L97.518 NON-PRESSURE CHRONIC ULCER OF OTHER PART OF RIGHT FOOT WITH OTHER SPECIFIED SEVERITY: ICD-10-CM

## 2024-06-25 DIAGNOSIS — I10 ESSENTIAL (PRIMARY) HYPERTENSION: ICD-10-CM

## 2024-06-25 DIAGNOSIS — I87.312 CHRONIC VENOUS HYPERTENSION (IDIOPATHIC) WITH ULCER OF LEFT LOWER EXTREMITY: ICD-10-CM

## 2024-06-28 ENCOUNTER — OUTPATIENT (OUTPATIENT)
Dept: OUTPATIENT SERVICES | Facility: HOSPITAL | Age: 64
LOS: 1 days | End: 2024-06-28
Payer: COMMERCIAL

## 2024-06-28 DIAGNOSIS — L97.518 NON-PRESSURE CHRONIC ULCER OF OTHER PART OF RIGHT FOOT WITH OTHER SPECIFIED SEVERITY: ICD-10-CM

## 2024-06-28 PROCEDURE — 29580 STRAPPING UNNA BOOT: CPT | Mod: 59

## 2024-06-28 PROCEDURE — 11042 DBRDMT SUBQ TIS 1ST 20SQCM/<: CPT

## 2024-06-28 RX ORDER — OXYCODONE AND ACETAMINOPHEN 5; 325 MG/1; MG/1
1 TABLET ORAL EVERY 4 HOURS
Refills: 0 | Status: DISCONTINUED | OUTPATIENT
Start: 2024-06-28 | End: 2024-06-28

## 2024-07-05 ENCOUNTER — OUTPATIENT (OUTPATIENT)
Dept: OUTPATIENT SERVICES | Facility: HOSPITAL | Age: 64
LOS: 1 days | End: 2024-07-05
Payer: COMMERCIAL

## 2024-07-05 DIAGNOSIS — L97.518 NON-PRESSURE CHRONIC ULCER OF OTHER PART OF RIGHT FOOT WITH OTHER SPECIFIED SEVERITY: ICD-10-CM

## 2024-07-05 PROCEDURE — 97602 WOUND(S) CARE NON-SELECTIVE: CPT

## 2024-07-10 DIAGNOSIS — L97.822 NON-PRESSURE CHRONIC ULCER OF OTHER PART OF LEFT LOWER LEG WITH FAT LAYER EXPOSED: ICD-10-CM

## 2024-07-10 DIAGNOSIS — L97.528 NON-PRESSURE CHRONIC ULCER OF OTHER PART OF LEFT FOOT WITH OTHER SPECIFIED SEVERITY: ICD-10-CM

## 2024-07-10 DIAGNOSIS — J45.998 OTHER ASTHMA: ICD-10-CM

## 2024-07-10 DIAGNOSIS — I89.0 LYMPHEDEMA, NOT ELSEWHERE CLASSIFIED: ICD-10-CM

## 2024-07-10 DIAGNOSIS — I87.312 CHRONIC VENOUS HYPERTENSION (IDIOPATHIC) WITH ULCER OF LEFT LOWER EXTREMITY: ICD-10-CM

## 2024-07-10 DIAGNOSIS — L03.115 CELLULITIS OF RIGHT LOWER LIMB: ICD-10-CM

## 2024-07-10 DIAGNOSIS — I10 ESSENTIAL (PRIMARY) HYPERTENSION: ICD-10-CM

## 2024-07-10 DIAGNOSIS — L97.518 NON-PRESSURE CHRONIC ULCER OF OTHER PART OF RIGHT FOOT WITH OTHER SPECIFIED SEVERITY: ICD-10-CM

## 2024-07-10 DIAGNOSIS — M19.90 UNSPECIFIED OSTEOARTHRITIS, UNSPECIFIED SITE: ICD-10-CM

## 2024-07-12 ENCOUNTER — OUTPATIENT (OUTPATIENT)
Dept: OUTPATIENT SERVICES | Facility: HOSPITAL | Age: 64
LOS: 1 days | End: 2024-07-12
Payer: COMMERCIAL

## 2024-07-12 DIAGNOSIS — J45.909 UNSPECIFIED ASTHMA, UNCOMPLICATED: ICD-10-CM

## 2024-07-12 DIAGNOSIS — M19.90 UNSPECIFIED OSTEOARTHRITIS, UNSPECIFIED SITE: ICD-10-CM

## 2024-07-12 DIAGNOSIS — L97.528 NON-PRESSURE CHRONIC ULCER OF OTHER PART OF LEFT FOOT WITH OTHER SPECIFIED SEVERITY: ICD-10-CM

## 2024-07-12 DIAGNOSIS — L97.518 NON-PRESSURE CHRONIC ULCER OF OTHER PART OF RIGHT FOOT WITH OTHER SPECIFIED SEVERITY: ICD-10-CM

## 2024-07-12 DIAGNOSIS — I87.311 CHRONIC VENOUS HYPERTENSION (IDIOPATHIC) WITH ULCER OF RIGHT LOWER EXTREMITY: ICD-10-CM

## 2024-07-12 DIAGNOSIS — I89.0 LYMPHEDEMA, NOT ELSEWHERE CLASSIFIED: ICD-10-CM

## 2024-07-12 DIAGNOSIS — I10 ESSENTIAL (PRIMARY) HYPERTENSION: ICD-10-CM

## 2024-07-12 DIAGNOSIS — M25.571 PAIN IN RIGHT ANKLE AND JOINTS OF RIGHT FOOT: ICD-10-CM

## 2024-07-12 PROCEDURE — 29580 STRAPPING UNNA BOOT: CPT | Mod: 59,50

## 2024-07-12 PROCEDURE — 73610 X-RAY EXAM OF ANKLE: CPT | Mod: RT

## 2024-07-12 PROCEDURE — 73610 X-RAY EXAM OF ANKLE: CPT | Mod: 26,RT

## 2024-07-12 PROCEDURE — 17250 CHEM CAUT OF GRANLTJ TISSUE: CPT

## 2024-07-17 DIAGNOSIS — I10 ESSENTIAL (PRIMARY) HYPERTENSION: ICD-10-CM

## 2024-07-17 DIAGNOSIS — I89.0 LYMPHEDEMA, NOT ELSEWHERE CLASSIFIED: ICD-10-CM

## 2024-07-17 DIAGNOSIS — L97.528 NON-PRESSURE CHRONIC ULCER OF OTHER PART OF LEFT FOOT WITH OTHER SPECIFIED SEVERITY: ICD-10-CM

## 2024-07-17 DIAGNOSIS — I87.312 CHRONIC VENOUS HYPERTENSION (IDIOPATHIC) WITH ULCER OF LEFT LOWER EXTREMITY: ICD-10-CM

## 2024-07-17 DIAGNOSIS — L97.822 NON-PRESSURE CHRONIC ULCER OF OTHER PART OF LEFT LOWER LEG WITH FAT LAYER EXPOSED: ICD-10-CM

## 2024-07-17 DIAGNOSIS — M19.90 UNSPECIFIED OSTEOARTHRITIS, UNSPECIFIED SITE: ICD-10-CM

## 2024-07-17 DIAGNOSIS — L97.518 NON-PRESSURE CHRONIC ULCER OF OTHER PART OF RIGHT FOOT WITH OTHER SPECIFIED SEVERITY: ICD-10-CM

## 2024-07-17 DIAGNOSIS — J45.998 OTHER ASTHMA: ICD-10-CM

## 2024-07-17 DIAGNOSIS — M25.571 PAIN IN RIGHT ANKLE AND JOINTS OF RIGHT FOOT: ICD-10-CM

## 2024-07-19 ENCOUNTER — OUTPATIENT (OUTPATIENT)
Dept: OUTPATIENT SERVICES | Facility: HOSPITAL | Age: 64
LOS: 1 days | End: 2024-07-19
Payer: COMMERCIAL

## 2024-07-19 DIAGNOSIS — L97.518 NON-PRESSURE CHRONIC ULCER OF OTHER PART OF RIGHT FOOT WITH OTHER SPECIFIED SEVERITY: ICD-10-CM

## 2024-07-19 PROCEDURE — 29580 STRAPPING UNNA BOOT: CPT | Mod: 59,LT

## 2024-07-19 PROCEDURE — 11042 DBRDMT SUBQ TIS 1ST 20SQCM/<: CPT

## 2024-07-19 PROCEDURE — 11045 DBRDMT SUBQ TISS EACH ADDL: CPT

## 2024-07-30 DIAGNOSIS — I89.0 LYMPHEDEMA, NOT ELSEWHERE CLASSIFIED: ICD-10-CM

## 2024-07-30 DIAGNOSIS — J45.909 UNSPECIFIED ASTHMA, UNCOMPLICATED: ICD-10-CM

## 2024-07-30 DIAGNOSIS — M25.571 PAIN IN RIGHT ANKLE AND JOINTS OF RIGHT FOOT: ICD-10-CM

## 2024-07-30 DIAGNOSIS — L97.528 NON-PRESSURE CHRONIC ULCER OF OTHER PART OF LEFT FOOT WITH OTHER SPECIFIED SEVERITY: ICD-10-CM

## 2024-07-30 DIAGNOSIS — I87.311 CHRONIC VENOUS HYPERTENSION (IDIOPATHIC) WITH ULCER OF RIGHT LOWER EXTREMITY: ICD-10-CM

## 2024-07-30 DIAGNOSIS — M19.90 UNSPECIFIED OSTEOARTHRITIS, UNSPECIFIED SITE: ICD-10-CM

## 2024-07-30 DIAGNOSIS — I10 ESSENTIAL (PRIMARY) HYPERTENSION: ICD-10-CM

## 2024-07-30 DIAGNOSIS — L97.518 NON-PRESSURE CHRONIC ULCER OF OTHER PART OF RIGHT FOOT WITH OTHER SPECIFIED SEVERITY: ICD-10-CM

## 2024-08-02 ENCOUNTER — OUTPATIENT (OUTPATIENT)
Dept: OUTPATIENT SERVICES | Facility: HOSPITAL | Age: 64
LOS: 1 days | End: 2024-08-02
Payer: COMMERCIAL

## 2024-08-02 DIAGNOSIS — L97.518 NON-PRESSURE CHRONIC ULCER OF OTHER PART OF RIGHT FOOT WITH OTHER SPECIFIED SEVERITY: ICD-10-CM

## 2024-08-02 PROCEDURE — 11042 DBRDMT SUBQ TIS 1ST 20SQCM/<: CPT

## 2024-08-02 PROCEDURE — 29580 STRAPPING UNNA BOOT: CPT | Mod: 59,LT

## 2024-08-02 RX ORDER — OXYCODONE AND ACETAMINOPHEN 10; 325 MG/1; MG/1
1 TABLET ORAL EVERY 4 HOURS
Refills: 0 | Status: DISCONTINUED | OUTPATIENT
Start: 2024-08-02 | End: 2024-08-02

## 2024-08-09 ENCOUNTER — OUTPATIENT (OUTPATIENT)
Dept: OUTPATIENT SERVICES | Facility: HOSPITAL | Age: 64
LOS: 1 days | End: 2024-08-09
Payer: COMMERCIAL

## 2024-08-09 DIAGNOSIS — L97.518 NON-PRESSURE CHRONIC ULCER OF OTHER PART OF RIGHT FOOT WITH OTHER SPECIFIED SEVERITY: ICD-10-CM

## 2024-08-09 PROCEDURE — 11042 DBRDMT SUBQ TIS 1ST 20SQCM/<: CPT

## 2024-08-12 DIAGNOSIS — L97.522 NON-PRESSURE CHRONIC ULCER OF OTHER PART OF LEFT FOOT WITH FAT LAYER EXPOSED: ICD-10-CM

## 2024-08-12 DIAGNOSIS — L97.512 NON-PRESSURE CHRONIC ULCER OF OTHER PART OF RIGHT FOOT WITH FAT LAYER EXPOSED: ICD-10-CM

## 2024-08-12 DIAGNOSIS — M19.90 UNSPECIFIED OSTEOARTHRITIS, UNSPECIFIED SITE: ICD-10-CM

## 2024-08-12 DIAGNOSIS — I10 ESSENTIAL (PRIMARY) HYPERTENSION: ICD-10-CM

## 2024-08-12 DIAGNOSIS — J45.909 UNSPECIFIED ASTHMA, UNCOMPLICATED: ICD-10-CM

## 2024-08-20 DIAGNOSIS — E11.49 TYPE 2 DIABETES MELLITUS WITH OTHER DIABETIC NEUROLOGICAL COMPLICATION: ICD-10-CM

## 2024-08-20 DIAGNOSIS — J45.909 UNSPECIFIED ASTHMA, UNCOMPLICATED: ICD-10-CM

## 2024-08-20 DIAGNOSIS — I73.9 PERIPHERAL VASCULAR DISEASE, UNSPECIFIED: ICD-10-CM

## 2024-08-20 DIAGNOSIS — Z87.891 PERSONAL HISTORY OF NICOTINE DEPENDENCE: ICD-10-CM

## 2024-08-20 DIAGNOSIS — L97.522 NON-PRESSURE CHRONIC ULCER OF OTHER PART OF LEFT FOOT WITH FAT LAYER EXPOSED: ICD-10-CM

## 2024-08-20 DIAGNOSIS — L85.9 EPIDERMAL THICKENING, UNSPECIFIED: ICD-10-CM

## 2024-08-20 DIAGNOSIS — L97.512 NON-PRESSURE CHRONIC ULCER OF OTHER PART OF RIGHT FOOT WITH FAT LAYER EXPOSED: ICD-10-CM

## 2024-08-20 DIAGNOSIS — M19.90 UNSPECIFIED OSTEOARTHRITIS, UNSPECIFIED SITE: ICD-10-CM

## 2024-08-20 DIAGNOSIS — Z79.4 LONG TERM (CURRENT) USE OF INSULIN: ICD-10-CM

## 2024-08-20 DIAGNOSIS — I10 ESSENTIAL (PRIMARY) HYPERTENSION: ICD-10-CM

## 2024-08-23 ENCOUNTER — OUTPATIENT (OUTPATIENT)
Dept: OUTPATIENT SERVICES | Facility: HOSPITAL | Age: 64
LOS: 1 days | End: 2024-08-23
Payer: COMMERCIAL

## 2024-08-23 DIAGNOSIS — L97.518 NON-PRESSURE CHRONIC ULCER OF OTHER PART OF RIGHT FOOT WITH OTHER SPECIFIED SEVERITY: ICD-10-CM

## 2024-08-23 PROCEDURE — 11042 DBRDMT SUBQ TIS 1ST 20SQCM/<: CPT

## 2024-08-28 DIAGNOSIS — I73.9 PERIPHERAL VASCULAR DISEASE, UNSPECIFIED: ICD-10-CM

## 2024-08-28 DIAGNOSIS — L97.512 NON-PRESSURE CHRONIC ULCER OF OTHER PART OF RIGHT FOOT WITH FAT LAYER EXPOSED: ICD-10-CM

## 2024-08-28 DIAGNOSIS — Z87.891 PERSONAL HISTORY OF NICOTINE DEPENDENCE: ICD-10-CM

## 2024-08-28 DIAGNOSIS — M19.90 UNSPECIFIED OSTEOARTHRITIS, UNSPECIFIED SITE: ICD-10-CM

## 2024-08-28 DIAGNOSIS — Z79.4 LONG TERM (CURRENT) USE OF INSULIN: ICD-10-CM

## 2024-08-28 DIAGNOSIS — I10 ESSENTIAL (PRIMARY) HYPERTENSION: ICD-10-CM

## 2024-08-28 DIAGNOSIS — J45.909 UNSPECIFIED ASTHMA, UNCOMPLICATED: ICD-10-CM

## 2024-08-28 DIAGNOSIS — E11.49 TYPE 2 DIABETES MELLITUS WITH OTHER DIABETIC NEUROLOGICAL COMPLICATION: ICD-10-CM

## 2024-08-30 ENCOUNTER — OUTPATIENT (OUTPATIENT)
Dept: OUTPATIENT SERVICES | Facility: HOSPITAL | Age: 64
LOS: 1 days | End: 2024-08-30
Payer: COMMERCIAL

## 2024-08-30 DIAGNOSIS — L97.518 NON-PRESSURE CHRONIC ULCER OF OTHER PART OF RIGHT FOOT WITH OTHER SPECIFIED SEVERITY: ICD-10-CM

## 2024-08-30 PROCEDURE — 29580 STRAPPING UNNA BOOT: CPT | Mod: 59,LT

## 2024-08-30 PROCEDURE — 97597 DBRDMT OPN WND 1ST 20 CM/<: CPT

## 2024-08-30 RX ORDER — OXYCODONE AND ACETAMINOPHEN 7.5; 325 MG/1; MG/1
1 TABLET ORAL EVERY 4 HOURS
Refills: 0 | Status: DISCONTINUED | OUTPATIENT
Start: 2024-08-30 | End: 2024-08-30

## 2024-09-05 NOTE — PATIENT PROFILE ADULT - HOME ACCESSIBILITY CONCERNS
Provider Staff:  Action required for this patient    Requires labs      Please see care gap opportunities below in Care Due Message.    Thanks!  [facility]     Appointments      Date Provider   Last Visit   6/13/2024 Nigel Naranjo MD   Next Visit   Visit date not found Nigel Naranjo MD     Refill Decision Note   Juan Bateman  is requesting a refill authorization.  Brief Assessment and Rationale for Refill:  Quick Discontinue     Medication Therapy Plan:  Dose increased to 40 mg; QDC      Comments:     Note composed:10:10 AM 09/05/2024             none

## 2024-09-06 ENCOUNTER — OUTPATIENT (OUTPATIENT)
Dept: OUTPATIENT SERVICES | Facility: HOSPITAL | Age: 64
LOS: 1 days | End: 2024-09-06
Payer: COMMERCIAL

## 2024-09-06 DIAGNOSIS — I10 ESSENTIAL (PRIMARY) HYPERTENSION: ICD-10-CM

## 2024-09-06 DIAGNOSIS — L97.512 NON-PRESSURE CHRONIC ULCER OF OTHER PART OF RIGHT FOOT WITH FAT LAYER EXPOSED: ICD-10-CM

## 2024-09-06 DIAGNOSIS — I73.9 PERIPHERAL VASCULAR DISEASE, UNSPECIFIED: ICD-10-CM

## 2024-09-06 DIAGNOSIS — L97.518 NON-PRESSURE CHRONIC ULCER OF OTHER PART OF RIGHT FOOT WITH OTHER SPECIFIED SEVERITY: ICD-10-CM

## 2024-09-06 DIAGNOSIS — M19.90 UNSPECIFIED OSTEOARTHRITIS, UNSPECIFIED SITE: ICD-10-CM

## 2024-09-06 DIAGNOSIS — E11.9 TYPE 2 DIABETES MELLITUS WITHOUT COMPLICATIONS: ICD-10-CM

## 2024-09-06 DIAGNOSIS — Z79.4 LONG TERM (CURRENT) USE OF INSULIN: ICD-10-CM

## 2024-09-06 DIAGNOSIS — Z87.891 PERSONAL HISTORY OF NICOTINE DEPENDENCE: ICD-10-CM

## 2024-09-06 DIAGNOSIS — L97.522 NON-PRESSURE CHRONIC ULCER OF OTHER PART OF LEFT FOOT WITH FAT LAYER EXPOSED: ICD-10-CM

## 2024-09-06 DIAGNOSIS — R60.9 EDEMA, UNSPECIFIED: ICD-10-CM

## 2024-09-06 DIAGNOSIS — M25.571 PAIN IN RIGHT ANKLE AND JOINTS OF RIGHT FOOT: ICD-10-CM

## 2024-09-06 DIAGNOSIS — J45.909 UNSPECIFIED ASTHMA, UNCOMPLICATED: ICD-10-CM

## 2024-09-06 PROCEDURE — 11042 DBRDMT SUBQ TIS 1ST 20SQCM/<: CPT

## 2024-09-13 ENCOUNTER — OUTPATIENT (OUTPATIENT)
Dept: OUTPATIENT SERVICES | Facility: HOSPITAL | Age: 64
LOS: 1 days | End: 2024-09-13
Payer: COMMERCIAL

## 2024-09-13 DIAGNOSIS — L97.518 NON-PRESSURE CHRONIC ULCER OF OTHER PART OF RIGHT FOOT WITH OTHER SPECIFIED SEVERITY: ICD-10-CM

## 2024-09-13 PROCEDURE — 11042 DBRDMT SUBQ TIS 1ST 20SQCM/<: CPT

## 2024-09-18 DIAGNOSIS — L97.512 NON-PRESSURE CHRONIC ULCER OF OTHER PART OF RIGHT FOOT WITH FAT LAYER EXPOSED: ICD-10-CM

## 2024-09-18 DIAGNOSIS — J45.909 UNSPECIFIED ASTHMA, UNCOMPLICATED: ICD-10-CM

## 2024-09-18 DIAGNOSIS — M25.571 PAIN IN RIGHT ANKLE AND JOINTS OF RIGHT FOOT: ICD-10-CM

## 2024-09-18 DIAGNOSIS — I10 ESSENTIAL (PRIMARY) HYPERTENSION: ICD-10-CM

## 2024-09-18 DIAGNOSIS — R60.9 EDEMA, UNSPECIFIED: ICD-10-CM

## 2024-09-18 DIAGNOSIS — E11.49 TYPE 2 DIABETES MELLITUS WITH OTHER DIABETIC NEUROLOGICAL COMPLICATION: ICD-10-CM

## 2024-09-18 DIAGNOSIS — M19.90 UNSPECIFIED OSTEOARTHRITIS, UNSPECIFIED SITE: ICD-10-CM

## 2024-09-18 DIAGNOSIS — Z87.891 PERSONAL HISTORY OF NICOTINE DEPENDENCE: ICD-10-CM

## 2024-09-18 DIAGNOSIS — I73.9 PERIPHERAL VASCULAR DISEASE, UNSPECIFIED: ICD-10-CM

## 2024-09-18 DIAGNOSIS — Z79.4 LONG TERM (CURRENT) USE OF INSULIN: ICD-10-CM

## 2024-09-20 ENCOUNTER — OUTPATIENT (OUTPATIENT)
Dept: OUTPATIENT SERVICES | Facility: HOSPITAL | Age: 64
LOS: 1 days | End: 2024-09-20
Payer: COMMERCIAL

## 2024-09-20 DIAGNOSIS — L97.518 NON-PRESSURE CHRONIC ULCER OF OTHER PART OF RIGHT FOOT WITH OTHER SPECIFIED SEVERITY: ICD-10-CM

## 2024-09-20 PROCEDURE — 29580 STRAPPING UNNA BOOT: CPT | Mod: RT

## 2024-09-24 DIAGNOSIS — Z79.4 LONG TERM (CURRENT) USE OF INSULIN: ICD-10-CM

## 2024-09-24 DIAGNOSIS — L97.512 NON-PRESSURE CHRONIC ULCER OF OTHER PART OF RIGHT FOOT WITH FAT LAYER EXPOSED: ICD-10-CM

## 2024-09-24 DIAGNOSIS — E11.49 TYPE 2 DIABETES MELLITUS WITH OTHER DIABETIC NEUROLOGICAL COMPLICATION: ICD-10-CM

## 2024-09-24 DIAGNOSIS — R60.9 EDEMA, UNSPECIFIED: ICD-10-CM

## 2024-09-24 DIAGNOSIS — I73.9 PERIPHERAL VASCULAR DISEASE, UNSPECIFIED: ICD-10-CM

## 2024-09-24 DIAGNOSIS — M19.90 UNSPECIFIED OSTEOARTHRITIS, UNSPECIFIED SITE: ICD-10-CM

## 2024-09-24 DIAGNOSIS — Z87.891 PERSONAL HISTORY OF NICOTINE DEPENDENCE: ICD-10-CM

## 2024-09-24 DIAGNOSIS — J45.909 UNSPECIFIED ASTHMA, UNCOMPLICATED: ICD-10-CM

## 2024-09-24 DIAGNOSIS — M25.571 PAIN IN RIGHT ANKLE AND JOINTS OF RIGHT FOOT: ICD-10-CM

## 2024-09-24 DIAGNOSIS — I10 ESSENTIAL (PRIMARY) HYPERTENSION: ICD-10-CM

## 2024-09-27 ENCOUNTER — OUTPATIENT (OUTPATIENT)
Dept: OUTPATIENT SERVICES | Facility: HOSPITAL | Age: 64
LOS: 1 days | End: 2024-09-27
Payer: COMMERCIAL

## 2024-09-27 DIAGNOSIS — L97.518 NON-PRESSURE CHRONIC ULCER OF OTHER PART OF RIGHT FOOT WITH OTHER SPECIFIED SEVERITY: ICD-10-CM

## 2024-09-27 PROCEDURE — 99212 OFFICE O/P EST SF 10 MIN: CPT

## 2024-10-01 DIAGNOSIS — I73.9 PERIPHERAL VASCULAR DISEASE, UNSPECIFIED: ICD-10-CM

## 2024-10-01 DIAGNOSIS — M19.90 UNSPECIFIED OSTEOARTHRITIS, UNSPECIFIED SITE: ICD-10-CM

## 2024-10-01 DIAGNOSIS — Z87.891 PERSONAL HISTORY OF NICOTINE DEPENDENCE: ICD-10-CM

## 2024-10-01 DIAGNOSIS — L97.512 NON-PRESSURE CHRONIC ULCER OF OTHER PART OF RIGHT FOOT WITH FAT LAYER EXPOSED: ICD-10-CM

## 2024-10-01 DIAGNOSIS — J45.909 UNSPECIFIED ASTHMA, UNCOMPLICATED: ICD-10-CM

## 2024-10-01 DIAGNOSIS — R60.9 EDEMA, UNSPECIFIED: ICD-10-CM

## 2024-10-01 DIAGNOSIS — I10 ESSENTIAL (PRIMARY) HYPERTENSION: ICD-10-CM

## 2024-10-01 DIAGNOSIS — M25.571 PAIN IN RIGHT ANKLE AND JOINTS OF RIGHT FOOT: ICD-10-CM

## 2024-10-01 DIAGNOSIS — Z79.4 LONG TERM (CURRENT) USE OF INSULIN: ICD-10-CM

## 2024-10-01 DIAGNOSIS — E11.49 TYPE 2 DIABETES MELLITUS WITH OTHER DIABETIC NEUROLOGICAL COMPLICATION: ICD-10-CM

## 2024-10-07 ENCOUNTER — OUTPATIENT (OUTPATIENT)
Dept: OUTPATIENT SERVICES | Facility: HOSPITAL | Age: 64
LOS: 1 days | End: 2024-10-07

## 2024-10-07 DIAGNOSIS — L97.518 NON-PRESSURE CHRONIC ULCER OF OTHER PART OF RIGHT FOOT WITH OTHER SPECIFIED SEVERITY: ICD-10-CM

## 2024-10-07 PROCEDURE — 99211 OFF/OP EST MAY X REQ PHY/QHP: CPT

## 2024-10-08 DIAGNOSIS — M25.571 PAIN IN RIGHT ANKLE AND JOINTS OF RIGHT FOOT: ICD-10-CM

## 2024-10-08 DIAGNOSIS — E11.49 TYPE 2 DIABETES MELLITUS WITH OTHER DIABETIC NEUROLOGICAL COMPLICATION: ICD-10-CM

## 2024-10-08 DIAGNOSIS — L97.512 NON-PRESSURE CHRONIC ULCER OF OTHER PART OF RIGHT FOOT WITH FAT LAYER EXPOSED: ICD-10-CM

## 2024-10-08 DIAGNOSIS — I73.9 PERIPHERAL VASCULAR DISEASE, UNSPECIFIED: ICD-10-CM

## 2024-10-08 DIAGNOSIS — I10 ESSENTIAL (PRIMARY) HYPERTENSION: ICD-10-CM

## 2024-10-08 DIAGNOSIS — Z79.4 LONG TERM (CURRENT) USE OF INSULIN: ICD-10-CM

## 2024-10-08 DIAGNOSIS — M19.90 UNSPECIFIED OSTEOARTHRITIS, UNSPECIFIED SITE: ICD-10-CM

## 2024-10-08 DIAGNOSIS — Z87.891 PERSONAL HISTORY OF NICOTINE DEPENDENCE: ICD-10-CM

## 2024-10-08 DIAGNOSIS — R60.9 EDEMA, UNSPECIFIED: ICD-10-CM

## 2024-10-08 DIAGNOSIS — J45.909 UNSPECIFIED ASTHMA, UNCOMPLICATED: ICD-10-CM

## 2024-10-16 DIAGNOSIS — E11.49 TYPE 2 DIABETES MELLITUS WITH OTHER DIABETIC NEUROLOGICAL COMPLICATION: ICD-10-CM

## 2024-10-16 DIAGNOSIS — J45.909 UNSPECIFIED ASTHMA, UNCOMPLICATED: ICD-10-CM

## 2024-10-16 DIAGNOSIS — M25.571 PAIN IN RIGHT ANKLE AND JOINTS OF RIGHT FOOT: ICD-10-CM

## 2024-10-16 DIAGNOSIS — I73.9 PERIPHERAL VASCULAR DISEASE, UNSPECIFIED: ICD-10-CM

## 2024-10-16 DIAGNOSIS — Z87.891 PERSONAL HISTORY OF NICOTINE DEPENDENCE: ICD-10-CM

## 2024-10-16 DIAGNOSIS — M19.90 UNSPECIFIED OSTEOARTHRITIS, UNSPECIFIED SITE: ICD-10-CM

## 2024-10-16 DIAGNOSIS — Z79.4 LONG TERM (CURRENT) USE OF INSULIN: ICD-10-CM

## 2024-10-16 DIAGNOSIS — I10 ESSENTIAL (PRIMARY) HYPERTENSION: ICD-10-CM
